# Patient Record
Sex: FEMALE | Race: BLACK OR AFRICAN AMERICAN | NOT HISPANIC OR LATINO | Employment: FULL TIME | URBAN - METROPOLITAN AREA
[De-identification: names, ages, dates, MRNs, and addresses within clinical notes are randomized per-mention and may not be internally consistent; named-entity substitution may affect disease eponyms.]

---

## 2017-03-27 RX ORDER — LEVONORGESTREL/ETHINYL ESTRADIOL AND ETHINYL ESTRADIOL 100-20(84)
KIT ORAL
Qty: 91 TABLET | Refills: 0 | Status: SHIPPED | OUTPATIENT
Start: 2017-03-27 | End: 2018-02-16

## 2018-02-16 ENCOUNTER — OFFICE VISIT (OUTPATIENT)
Dept: OBSTETRICS AND GYNECOLOGY | Facility: CLINIC | Age: 43
End: 2018-02-16

## 2018-02-16 VITALS
HEART RATE: 77 BPM | DIASTOLIC BLOOD PRESSURE: 60 MMHG | SYSTOLIC BLOOD PRESSURE: 105 MMHG | WEIGHT: 204 LBS | HEIGHT: 72 IN | BODY MASS INDEX: 27.63 KG/M2

## 2018-02-16 DIAGNOSIS — Z01.419 PAP SMEAR, AS PART OF ROUTINE GYNECOLOGICAL EXAMINATION: Primary | ICD-10-CM

## 2018-02-16 DIAGNOSIS — N92.6 IRREGULAR BLEEDING: ICD-10-CM

## 2018-02-16 DIAGNOSIS — Z80.3 FAMILY HISTORY OF BREAST CANCER: ICD-10-CM

## 2018-02-16 DIAGNOSIS — S03.00XA DISLOCATION OF TEMPOROMANDIBULAR JOINT, INITIAL ENCOUNTER: ICD-10-CM

## 2018-02-16 DIAGNOSIS — Z01.411 ENCOUNTER FOR GYNECOLOGICAL EXAMINATION WITH ABNORMAL FINDING: ICD-10-CM

## 2018-02-16 PROCEDURE — 99213 OFFICE O/P EST LOW 20 MIN: CPT | Performed by: OBSTETRICS & GYNECOLOGY

## 2018-02-16 PROCEDURE — 99386 PREV VISIT NEW AGE 40-64: CPT | Performed by: OBSTETRICS & GYNECOLOGY

## 2018-02-16 RX ORDER — ALBUTEROL SULFATE 90 UG/1
AEROSOL, METERED RESPIRATORY (INHALATION)
Refills: 0 | COMMUNITY
Start: 2018-01-05 | End: 2018-07-06

## 2018-02-16 RX ORDER — FLUTICASONE PROPIONATE 50 MCG
SPRAY, SUSPENSION (ML) NASAL
Refills: 5 | COMMUNITY
Start: 2017-12-07 | End: 2018-07-06

## 2018-02-16 RX ORDER — CETIRIZINE HYDROCHLORIDE 10 MG/1
TABLET, CHEWABLE ORAL
Refills: 3 | COMMUNITY
Start: 2017-12-07 | End: 2018-07-06

## 2018-02-16 RX ORDER — FLUOXETINE 20 MG/1
TABLET, FILM COATED ORAL
Refills: 0 | COMMUNITY
Start: 2017-12-07 | End: 2018-02-28

## 2018-02-16 NOTE — PROGRESS NOTES
Subjective   Ramona Bear is a 42 y.o. female  4, Para 3 AB 1, Living 3.  Last annual , last pap 2014, last mammogram 0, last colonoscopy 0.  Cc: Annual exam  History of Present Illness  Patient is here for an annual exam but is also having periods approximately every 2 weeks since .  Now periods approximately every 3-1/2 weeks but are still heavier and different from what she is used to.  Status post tubal ligation done in  at which time a small fibroid was identified and some filmy adhesions noted on the right tube along with a hydrosalpinx.  She denies dyspareunia.  States that sometimes when she is a little bit constipated she has a lot of pelvic pressure particularly in the vagina  The following portions of the patient's history were reviewed and updated as appropriate: allergies, current medications, past family history, past medical history, past social history, past surgical history and problem list.    Review of Systems   HENT:        Jaw pain with popping sensation   Genitourinary: Positive for menstrual problem.   All other systems reviewed and are negative.        Past Medical History:   Diagnosis Date   • Anxiety    • Cervical dysplasia    • Fibroid, uterine      Menstrual History:  OB History      Para Term  AB Living    4 3 3  1 3    SAB TAB Ectopic Multiple Live Births    1             Menarche age: 15  Patient's last menstrual period was 2018 (exact date).  Menses normal every 28 days but see history of present illness for details       Past Surgical History:   Procedure Laterality Date   • D&C WITH SUCTION     • EYE SURGERY     • LAPAROSCOPIC TUBAL LIGATION     • LASER ABLATION OF THE CERVIX       OB History      Para Term  AB Living    4 3 3  1 3    SAB TAB Ectopic Multiple Live Births    1            Family History   Problem Relation Age of Onset   • Diabetes Father    • Hypertension Father    • Breast cancer Paternal Aunt 45   •  "Breast cancer Paternal Aunt 45     History   Smoking Status   • Former Smoker   • Types: Cigarettes   • Quit date: 2/16/2017   Smokeless Tobacco   • Never Used     Comment: Occasional     History   Alcohol Use   • Yes     Comment: social     Health Maintenance   Topic Date Due   • TDAP/TD VACCINES (1 - Tdap) 07/08/1996   • PAP SMEAR  06/26/2017   • INFLUENZA VACCINE  08/01/2017   • MAMMOGRAM  02/16/2018       Current Outpatient Prescriptions:   •  cetirizine (ZyrTEC) 10 MG chewable tablet, CSW 1 T PO D UTD, Disp: , Rfl: 3  •  FLUoxetine (PROzac) 20 MG tablet, , Disp: , Rfl: 0  •  fluticasone (FLONASE) 50 MCG/ACT nasal spray, SHAKE LQ AND U 2 SPRAYS IEN BID, Disp: , Rfl: 5  •  VENTOLIN  (90 Base) MCG/ACT inhaler, INHALE 2 PUFFS PO Q 4 H PRF SOB, Disp: , Rfl: 0  Sexual History:Active  STD: Gonorrhea, Trichomonas, HPV       Objective   Vitals:    02/16/18 0951   BP: 105/60   Pulse: 77   Weight: 92.5 kg (204 lb)   Height: 190.5 cm (75\")     Physical Exam   Constitutional: She is oriented to person, place, and time. She appears well-developed and well-nourished.   HENT:   Head: Normocephalic.   Popping sensation and noise with movement of the jaw   Eyes: Pupils are equal, round, and reactive to light.   Neck: Normal range of motion. No thyromegaly present.   Cardiovascular: Normal rate, regular rhythm, normal heart sounds and intact distal pulses.    Pulmonary/Chest: Effort normal and breath sounds normal. No respiratory distress. She exhibits no tenderness. Right breast exhibits no inverted nipple, no mass, no nipple discharge, no skin change and no tenderness. Left breast exhibits no inverted nipple, no mass, no nipple discharge, no skin change and no tenderness. Breasts are symmetrical.   Abdominal: Soft. Bowel sounds are normal. Hernia confirmed negative in the right inguinal area and confirmed negative in the left inguinal area.   Genitourinary: Vagina normal and uterus normal. No breast tenderness or " discharge. Pelvic exam was performed with patient supine. There is no rash, tenderness, lesion or injury on the right labia. There is no rash, tenderness, lesion or injury on the left labia. Uterus is not enlarged and not tender. Cervix exhibits no motion tenderness, no discharge and no friability. Right adnexum displays no mass, no tenderness and no fullness. Left adnexum displays no mass, no tenderness and no fullness.   Genitourinary Comments: Retroverted uterus   Lymphadenopathy:     She has no cervical adenopathy.        Right: No inguinal adenopathy present.        Left: No inguinal adenopathy present.   Neurological: She is alert and oriented to person, place, and time. She has normal reflexes.   Skin: Skin is warm and dry.   Psychiatric: She has a normal mood and affect. Her behavior is normal. Judgment and thought content normal.         Assessment/Plan   Ramona was seen today for gynecologic exam.    Diagnoses and all orders for this visit:    Pap smear, as part of routine gynecological examination  Comments:  Schedule screening mammogram  Orders:  -     IGP, Apt HPV,rfx 16 / 18,45 - ThinPrep Vial, Cervix    Irregular bleeding  Comments:  We'll get ultrasound was same-day appointment    Family history of breast cancer  Comments:  BRCA testing    Encounter for gynecological examination with abnormal finding    Dislocation of temporomandibular joint, initial encounter  Comments:  Referral to Dr. Terell Quiñonez was counseled about breast self-examination, mammograms, history of breast cancer, TMJ

## 2018-02-22 LAB
CYTOLOGIST CVX/VAG CYTO: ABNORMAL
CYTOLOGY CVX/VAG DOC THIN PREP: ABNORMAL
DX ICD CODE: ABNORMAL
HIV 1 & 2 AB SER-IMP: ABNORMAL
HPV I/H RISK 4 DNA CVX QL PROBE+SIG AMP: POSITIVE
HPV16 DNA CVX QL PROBE+SIG AMP: POSITIVE
HPV18+45 E6+E7 MRNA CVX QL NAA+PROBE: NEGATIVE
OTHER STN SPEC: ABNORMAL
PATH REPORT.FINAL DX SPEC: ABNORMAL
STAT OF ADQ CVX/VAG CYTO-IMP: ABNORMAL

## 2018-02-23 ENCOUNTER — TELEPHONE (OUTPATIENT)
Dept: OBSTETRICS AND GYNECOLOGY | Facility: CLINIC | Age: 43
End: 2018-02-23

## 2018-02-23 NOTE — TELEPHONE ENCOUNTER
----- Message from Bjorn Perry MD sent at 2/22/2018  9:38 AM EST -----  Repeat Pap smear in 6 months for HPV  ----- Message -----     From: [facility] Reflab Results In     Sent: 2/22/2018   6:15 AM       To: Bjorn Perry MD

## 2018-02-28 ENCOUNTER — APPOINTMENT (OUTPATIENT)
Dept: WOMENS IMAGING | Facility: HOSPITAL | Age: 43
End: 2018-02-28

## 2018-02-28 ENCOUNTER — PROCEDURE VISIT (OUTPATIENT)
Dept: OBSTETRICS AND GYNECOLOGY | Facility: CLINIC | Age: 43
End: 2018-02-28

## 2018-02-28 ENCOUNTER — OFFICE VISIT (OUTPATIENT)
Dept: OBSTETRICS AND GYNECOLOGY | Facility: CLINIC | Age: 43
End: 2018-02-28

## 2018-02-28 VITALS
HEART RATE: 79 BPM | WEIGHT: 201 LBS | SYSTOLIC BLOOD PRESSURE: 101 MMHG | HEIGHT: 72 IN | BODY MASS INDEX: 27.22 KG/M2 | DIASTOLIC BLOOD PRESSURE: 65 MMHG

## 2018-02-28 DIAGNOSIS — N92.1 MENORRHAGIA WITH IRREGULAR CYCLE: Primary | ICD-10-CM

## 2018-02-28 DIAGNOSIS — N92.6 IRREGULAR MENSES: Primary | ICD-10-CM

## 2018-02-28 DIAGNOSIS — Z12.31 VISIT FOR SCREENING MAMMOGRAM: Primary | ICD-10-CM

## 2018-02-28 LAB
B-HCG UR QL: NEGATIVE
INTERNAL NEGATIVE CONTROL: NEGATIVE
INTERNAL POSITIVE CONTROL: POSITIVE
Lab: NORMAL

## 2018-02-28 PROCEDURE — 58100 BIOPSY OF UTERUS LINING: CPT | Performed by: OBSTETRICS & GYNECOLOGY

## 2018-02-28 PROCEDURE — 76830 TRANSVAGINAL US NON-OB: CPT | Performed by: OBSTETRICS & GYNECOLOGY

## 2018-02-28 PROCEDURE — 81025 URINE PREGNANCY TEST: CPT | Performed by: OBSTETRICS & GYNECOLOGY

## 2018-02-28 PROCEDURE — 77067 SCR MAMMO BI INCL CAD: CPT | Performed by: OBSTETRICS & GYNECOLOGY

## 2018-02-28 PROCEDURE — 77067 SCR MAMMO BI INCL CAD: CPT | Performed by: RADIOLOGY

## 2018-03-02 ENCOUNTER — TELEPHONE (OUTPATIENT)
Dept: OBSTETRICS AND GYNECOLOGY | Facility: CLINIC | Age: 43
End: 2018-03-02

## 2018-03-02 NOTE — TELEPHONE ENCOUNTER
----- Message from Bjorn Perry MD sent at 3/2/2018  9:52 AM EST -----  Tell the patient her mammogram was normal  ----- Message -----     From: Interface, Scans Incoming     Sent: 3/2/2018   8:17 AM       To: Bjorn Perry MD

## 2018-03-05 LAB
DX ICD CODE: NORMAL
DX ICD CODE: NORMAL
PATH REPORT.FINAL DX SPEC: NORMAL
PATH REPORT.GROSS SPEC: NORMAL
PATH REPORT.SITE OF ORIGIN SPEC: NORMAL
PATHOLOGIST NAME: NORMAL
PAYMENT PROCEDURE: NORMAL

## 2018-03-06 ENCOUNTER — OFFICE VISIT (OUTPATIENT)
Dept: OBSTETRICS AND GYNECOLOGY | Facility: CLINIC | Age: 43
End: 2018-03-06

## 2018-03-06 VITALS
WEIGHT: 203 LBS | DIASTOLIC BLOOD PRESSURE: 71 MMHG | BODY MASS INDEX: 27.5 KG/M2 | SYSTOLIC BLOOD PRESSURE: 112 MMHG | HEIGHT: 72 IN | HEART RATE: 80 BPM

## 2018-03-06 DIAGNOSIS — N92.6 IRREGULAR BLEEDING: Primary | ICD-10-CM

## 2018-03-06 DIAGNOSIS — D25.9 UTERINE LEIOMYOMA, UNSPECIFIED LOCATION: ICD-10-CM

## 2018-03-06 PROCEDURE — 99213 OFFICE O/P EST LOW 20 MIN: CPT | Performed by: OBSTETRICS & GYNECOLOGY

## 2018-03-06 RX ORDER — NORETHINDRONE ACETATE AND ETHINYL ESTRADIOL 1MG-20(21)
1 KIT ORAL DAILY
Qty: 28 TABLET | Refills: 12 | Status: SHIPPED | OUTPATIENT
Start: 2018-03-06 | End: 2018-07-06

## 2018-03-06 NOTE — PROGRESS NOTES
Subjective   Ramona Bear is a 42 y.o. female.   EC: Follow-up endometrial biopsy  History of Present Illness  Pathology report showed no active endometrium on the biopsy site with negative endocervical tissue.  Endometrial shadow was 0.9 cm with no evidence of endometrial pathology and as such no repeat endometrial biopsy will be performed.  Patient is a nonsmoker.  The following portions of the patient's history were reviewed and updated as appropriate: allergies, current medications, past family history, past medical history, past social history, past surgical history and problem list.    Review of Systems   Genitourinary: Positive for menstrual problem.   All other systems reviewed and are negative.      Objective   Physical Exam   Constitutional: She appears well-developed and well-nourished.   Neurological: She is alert.   Skin: Skin is warm.   Psychiatric: She has a normal mood and affect. Her behavior is normal. Judgment and thought content normal.   Vitals reviewed.      Assessment/Plan   Ramona was seen today for follow-up.    Diagnoses and all orders for this visit:    Irregular bleeding  Comments:  Try to regulate with birth control pills    Uterine leiomyoma, unspecified location    Other orders  -     norethindrone-ethinyl estradiol FE (MICROGESTIN FE 1/20) 1-20 MG-MCG per tablet; Take 1 tablet by mouth Daily.   Face-to-face time with patient 15 minutes of which 15 minutes were spent discussing the above-noted findings and plan of therapy

## 2018-06-28 RX ORDER — LEVONORGESTREL/ETHINYL ESTRADIOL AND ETHINYL ESTRADIOL 100-20(84)
KIT ORAL
Qty: 91 TABLET | Refills: 0 | Status: SHIPPED | OUTPATIENT
Start: 2018-06-28 | End: 2018-07-06

## 2018-07-06 ENCOUNTER — OFFICE VISIT (OUTPATIENT)
Dept: OBSTETRICS AND GYNECOLOGY | Facility: CLINIC | Age: 43
End: 2018-07-06

## 2018-07-06 VITALS
BODY MASS INDEX: 26.01 KG/M2 | DIASTOLIC BLOOD PRESSURE: 68 MMHG | WEIGHT: 192 LBS | SYSTOLIC BLOOD PRESSURE: 118 MMHG | HEIGHT: 72 IN | HEART RATE: 68 BPM

## 2018-07-06 DIAGNOSIS — N89.8 VAGINAL DISCHARGE: Primary | ICD-10-CM

## 2018-07-06 DIAGNOSIS — N84.1 CERVICAL POLYP: ICD-10-CM

## 2018-07-06 PROCEDURE — 99213 OFFICE O/P EST LOW 20 MIN: CPT | Performed by: OBSTETRICS & GYNECOLOGY

## 2018-07-06 RX ORDER — NORETHINDRONE ACETATE AND ETHINYL ESTRADIOL 1MG-20(21)
KIT ORAL
COMMUNITY
End: 2021-08-09

## 2018-07-06 NOTE — PROGRESS NOTES
Subjective   Ramona Bear is a 42 y.o. female.   Cc: Postcoital bleeding  History of Present Illness  She's had a 1-2 week history of postcoital mucus discharge with some bloody material minute.  Denies any dysmenorrhea or dyspareunia.  She has had a previous history of dysplasia which was treated with laser ablation of the cervix.  The following portions of the patient's history were reviewed and updated as appropriate: allergies, current medications, past family history, past medical history, past social history, past surgical history and problem list.    Review of Systems   Genitourinary:        See history of present illness   All other systems reviewed and are negative.      Objective   Physical Exam   Constitutional: She is oriented to person, place, and time. She appears well-developed and well-nourished. No distress.   Genitourinary: Vagina normal. Pelvic exam was performed with patient supine. There is no lesion on the right labia. There is no lesion on the left labia. Cervix exhibits friability. Cervix exhibits no motion tenderness and no discharge.   Genitourinary Comments: Cervical polyp noted in the cervical os slightly superior.  Portions were removed.  Monsel solution applied   Neurological: She is alert and oriented to person, place, and time.   Skin: Skin is warm and dry.   Psychiatric: She has a normal mood and affect. Her behavior is normal. Judgment and thought content normal.   Vitals reviewed.      Assessment/Plan   Ramona was seen today for vaginal bleeding.    Diagnoses and all orders for this visit:    Vaginal discharge  -     NuSwab VG+ - Swab, Vagina    Cervical polyp  -     Reference Histopathology

## 2018-07-10 ENCOUNTER — DOCUMENTATION (OUTPATIENT)
Dept: OBSTETRICS AND GYNECOLOGY | Facility: CLINIC | Age: 43
End: 2018-07-10

## 2018-07-10 NOTE — PROGRESS NOTES
Just received a telephone call from the pathologist who is reading the biopsy from the cervical polyp obtained last week.  Diagnoses uncertain but it appears to be malignant and the differentiation is unclear.  At this time is pending further diagnostic testing.  We'll notification of the patient's until a more specific diagnosis can be obtained.

## 2018-07-11 LAB
A VAGINAE DNA VAG QL NAA+PROBE: ABNORMAL SCORE
BVAB2 DNA VAG QL NAA+PROBE: ABNORMAL SCORE
C ALBICANS DNA VAG QL NAA+PROBE: NEGATIVE
C GLABRATA DNA VAG QL NAA+PROBE: NEGATIVE
C TRACH RRNA SPEC QL NAA+PROBE: NEGATIVE
MEGA1 DNA VAG QL NAA+PROBE: ABNORMAL SCORE
N GONORRHOEA RRNA SPEC QL NAA+PROBE: NEGATIVE
T VAGINALIS RRNA SPEC QL NAA+PROBE: NEGATIVE

## 2018-07-12 ENCOUNTER — TELEPHONE (OUTPATIENT)
Dept: OBSTETRICS AND GYNECOLOGY | Facility: CLINIC | Age: 43
End: 2018-07-12

## 2018-07-12 RX ORDER — METRONIDAZOLE 500 MG/1
500 TABLET ORAL 2 TIMES DAILY
Qty: 10 TABLET | Refills: 0 | Status: SHIPPED | OUTPATIENT
Start: 2018-07-12 | End: 2018-07-17

## 2018-07-12 NOTE — TELEPHONE ENCOUNTER
----- Message from Bjorn Perry MD sent at 7/12/2018 10:48 AM EDT -----  Tell the patient she has BV and a prescription is been sent to the pharmacy.  Tell her that we are awaiting further testing on the biopsy of her cervical polyp and the flap wanted further tests to make the diagnosis .As soon as we get the results we will call her  ----- Message -----  From: Elvira, Reflab Results In  Sent: 7/10/2018   3:16 PM  To: Bjorn Perry MD

## 2018-07-18 ENCOUNTER — TELEPHONE (OUTPATIENT)
Dept: OBSTETRICS AND GYNECOLOGY | Facility: CLINIC | Age: 43
End: 2018-07-18

## 2018-07-18 DIAGNOSIS — C53.9 MALIGNANT NEOPLASM OF CERVIX, UNSPECIFIED SITE (HCC): Primary | ICD-10-CM

## 2018-07-18 NOTE — TELEPHONE ENCOUNTER
I spoke with the patient in the pathologist today about the findings on her cervical biopsy..  Pathology is still pending 2 due to some technical problems in transmitting the cellblock's to the consulting service.  This should be ready for by the end of the week.  I talked to the patient and advised her that there is malignancy source of which is uncertain and we're awaiting final pathology report.  We will schedule her with GYN oncology ASAP.  Patient understands and accepts the plan.

## 2018-07-20 ENCOUNTER — TELEPHONE (OUTPATIENT)
Dept: OBSTETRICS AND GYNECOLOGY | Facility: CLINIC | Age: 43
End: 2018-07-20

## 2018-07-20 LAB
DX ICD CODE: NORMAL
PATH REPORT.FINAL DX SPEC: NORMAL
PATH REPORT.GROSS SPEC: NORMAL
PATH REPORT.SITE OF ORIGIN SPEC: NORMAL
PATH REPORT.SUPPLEMENTAL REPORTS SPEC: NORMAL
PATHOLOGIST NAME: NORMAL
PAYMENT PROCEDURE: NORMAL

## 2018-07-20 NOTE — TELEPHONE ENCOUNTER
----- Message from Cynthia Reyer sent at 7/20/2018  8:20 AM EDT -----  Pt has a couple of questions because she is very nervous about seeing her oncologist. She is asking if you can please give her a call. # [phone]

## 2018-07-20 NOTE — TELEPHONE ENCOUNTER
Some questions about what she could anticipate as to therapy.  I advised her that she probably will have a hysterectomy performed and further therapy depends on the pathology and the findings at the time of surgery.

## 2018-07-20 NOTE — TELEPHONE ENCOUNTER
I spoke with the patient today and gave her the final pathology report.  Message was also sent to Dr. David ordering the pathology report

## 2018-07-24 ENCOUNTER — TELEPHONE (OUTPATIENT)
Dept: OBSTETRICS AND GYNECOLOGY | Facility: CLINIC | Age: 43
End: 2018-07-24

## 2018-07-24 NOTE — TELEPHONE ENCOUNTER
----- Message from Lesly Chan MA sent at 7/24/2018 11:06 AM EDT -----  Will you make sure the oncologist has this report.joe

## 2019-04-04 ENCOUNTER — OFFICE VISIT (OUTPATIENT)
Dept: OBSTETRICS AND GYNECOLOGY | Facility: CLINIC | Age: 44
End: 2019-04-04

## 2019-04-04 VITALS
BODY MASS INDEX: 28.44 KG/M2 | SYSTOLIC BLOOD PRESSURE: 116 MMHG | DIASTOLIC BLOOD PRESSURE: 64 MMHG | WEIGHT: 210 LBS | HEIGHT: 72 IN

## 2019-04-04 DIAGNOSIS — R23.8 VESICULAR SKIN LESIONS: Primary | ICD-10-CM

## 2019-04-04 PROCEDURE — 99214 OFFICE O/P EST MOD 30 MIN: CPT | Performed by: OBSTETRICS & GYNECOLOGY

## 2019-04-04 RX ORDER — BUSPIRONE HYDROCHLORIDE 7.5 MG/1
7.5 TABLET ORAL
COMMUNITY
Start: 2019-02-05 | End: 2021-08-09

## 2019-04-04 RX ORDER — OLANZAPINE 2.5 MG/1
2.5 TABLET ORAL DAILY
COMMUNITY
Start: 2019-02-05 | End: 2021-08-09

## 2019-04-04 NOTE — PROGRESS NOTES
HPI   Ramona Bear  is a 43 y.o. female who presents for 2 reasons.  First, she has been experiencing pain in her pubic bone.  This is been present for the last 1-2 years.  It is neither improved, nor has it worsened.  It is not accompanied by vaginal bleeding.  The patient is uncertain if she has fever or chills, but she does experience sweats.  Her course is complicated by history of a neuroendocrine tumor of the cervix.  This was treated by GYN oncology with a hysterectomy.  Also, this was followed by chemotherapy and radiation.  The patient is still current with GYN oncology.  Her last PET scan was in November and the patient reports that it was negative.  Second, the patient has been experiencing blisters on the vulva.  This occurs approximately every month to 6 weeks.  The blisters are very painful.  They itch and last for approximately 1 week.  She has not previously been diagnosed with herpes.  She has blisters present today.    Chief Complaint   Patient presents with   • Follow-up     Patient is here for pelvic bone pain.       Past Medical History:   Diagnosis Date   • Anxiety    • BRCA negative    • Cervical dysplasia    • Fibroid, uterine        Past Surgical History:   Procedure Laterality Date   • D&C WITH SUCTION     • EYE SURGERY     • LAPAROSCOPIC TUBAL LIGATION     • LASER ABLATION OF THE CERVIX         Social History     Socioeconomic History   • Marital status:      Spouse name: Not on file   • Number of children: Not on file   • Years of education: Not on file   • Highest education level: Not on file   Tobacco Use   • Smoking status: Former Smoker     Types: Cigarettes     Last attempt to quit: 2017     Years since quittin.1   • Smokeless tobacco: Never Used   • Tobacco comment: Occasional   Substance and Sexual Activity   • Alcohol use: Yes     Comment: social   • Drug use: No   • Sexual activity: Yes     Partners: Male     Birth control/protection: Surgical       The  following portions of the patient's history were reviewed and updated as appropriate: allergies, current medications, past family history, past medical history, past social history, past surgical history and problem list.    Review of Systems this is positive for pain in the pubic bone.  It is positive for intermittent vulvar and perineal blisters.  It is negative for vaginal bleeding.  It is equivocal for fever or chills.  It is positive for sweats.  It is negative for unexplained weight change.  It is negative for suicidal or homicidal ideation.  All other systems are reviewed and are negative          Physical Exam   Constitutional: She is oriented to person, place, and time. She appears well-developed and well-nourished.   HENT:   Head: Normocephalic and atraumatic.   Abdominal: Soft. She exhibits no distension and no mass. There is no tenderness.   Genitourinary:         Genitourinary Comments: Vesicular lesions are present at the patient's right labia minora and right labia majora.  There are no other vulvar lesions visible or palpable.  The vagina is estrogenized and without lesion  The cuff is well approximated  There are no pelvic masses palpable   Neurological: She is alert and oriented to person, place, and time.   Skin: Skin is warm and dry.   Psychiatric: She has a normal mood and affect. Her behavior is normal.   Nursing note and vitals reviewed.      Assessment    Ramona was seen today for follow-up.    Diagnoses and all orders for this visit:    Vesicular skin lesions        Plan    Social History     Tobacco Use   Smoking Status Former Smoker   • Types: Cigarettes   • Last attempt to quit: 2017   • Years since quittin.1   Tobacco Comment    Occasional   1. Pubic bone pain.  The cause of this is not entirely clear.  There is not a causative lesion found on physical examination today.  I counseled the patient regarding this.  I recommend that she follow-up with her oncologist for further  evaluation.  This could represent a side effect of radiation.  The patient has an appointment with her oncologist soon.  2. Vesicular lesions of the perineum.  Counseled.  By physical examination, this is most consistent with herpes virus.  We discussed the pathophysiology of this and possible treatments.  Cultures were performed.  Further workup pending the results of the studies.    3.

## 2019-04-10 LAB
A VAGINAE DNA VAG QL NAA+PROBE: ABNORMAL SCORE
BVAB2 DNA VAG QL NAA+PROBE: ABNORMAL SCORE
C ALBICANS DNA VAG QL NAA+PROBE: NEGATIVE
C GLABRATA DNA VAG QL NAA+PROBE: NEGATIVE
HSV1 DNA SPEC QL NAA+PROBE: NEGATIVE
HSV2 DNA SPEC QL NAA+PROBE: POSITIVE
MEGA1 DNA VAG QL NAA+PROBE: ABNORMAL SCORE
T VAGINALIS RRNA SPEC QL NAA+PROBE: NEGATIVE

## 2019-04-10 RX ORDER — VALACYCLOVIR HYDROCHLORIDE 500 MG/1
500 TABLET, FILM COATED ORAL 2 TIMES DAILY
Qty: 18 TABLET | Refills: 4 | Status: SHIPPED | OUTPATIENT
Start: 2019-04-10 | End: 2019-04-13

## 2019-04-29 ENCOUNTER — TELEPHONE (OUTPATIENT)
Dept: OBSTETRICS AND GYNECOLOGY | Facility: CLINIC | Age: 44
End: 2019-04-29

## 2019-04-29 NOTE — TELEPHONE ENCOUNTER
Pt called to report the valACYclovir (VALTREX) 500 MG tablet, rx'd on 4/10/19, is not covered by her insurance, and is quite expensive out of pocket.  Pt is asking for an alternative medication, if available.  Please advise.    Pt # [phone]

## 2019-04-30 NOTE — TELEPHONE ENCOUNTER
Please ask the patient to contact her insurer for a list of covered medications.  This is the first time I have seen Valtrex not to be covered.  It is possible that a mistake has been made?

## 2021-07-07 ENCOUNTER — TELEPHONE (OUTPATIENT)
Dept: OBSTETRICS AND GYNECOLOGY | Facility: CLINIC | Age: 46
End: 2021-07-07

## 2021-07-07 NOTE — TELEPHONE ENCOUNTER
"Luisito,     I got a follow up BRCA test that [facility] did in 2018 - that you saw as a patient in 2019.     I sent a message to Lesly Chan to reach out to her about this result as well as seeing in office for discussion of results.     Received follow up from [facility].  In Feb, 2018 she had [facility] BRCA testing done with Dr. Perry.  I can not seen anywhere in Epic that these results were conveyed to the patient, nor any plans related to them. 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    Just wanted to let you know so you can handle as you feel appropriate. I will leave it in your box at [facility] to review.     Thanks,   Royce   "

## 2021-07-07 NOTE — TELEPHONE ENCOUNTER
"Libia,     Last seen by Dr. Salazar in 2019. Received follow up from [facility].  In Feb, 2018 she had [facility] BRCA testing done with Dr. Perry.  I can not seen anywhere in Epic that these results were conveyed to the patient, nor any plans related to them. Initial esults showed MSH6 variant - \"genetic variant of uncertain significance\" - Now thankfully this has been downgraded to no clinical significance.     She should likely follow up and discuss with Dr. Salazar at an annual exam (overdue).  Can you reach out to her about this??    Thanks,   Dr. Darnell  "

## 2021-07-22 ENCOUNTER — TELEPHONE (OUTPATIENT)
Dept: GASTROENTEROLOGY | Facility: CLINIC | Age: 46
End: 2021-07-22

## 2021-07-22 ENCOUNTER — OFFICE VISIT (OUTPATIENT)
Dept: OBSTETRICS AND GYNECOLOGY | Facility: CLINIC | Age: 46
End: 2021-07-22

## 2021-07-22 VITALS
DIASTOLIC BLOOD PRESSURE: 67 MMHG | SYSTOLIC BLOOD PRESSURE: 116 MMHG | WEIGHT: 215 LBS | HEIGHT: 72 IN | BODY MASS INDEX: 29.12 KG/M2

## 2021-07-22 DIAGNOSIS — Z80.3 FH: BREAST CANCER: ICD-10-CM

## 2021-07-22 DIAGNOSIS — C53.9 MALIGNANT NEOPLASM OF CERVIX, UNSPECIFIED SITE (HCC): ICD-10-CM

## 2021-07-22 DIAGNOSIS — Z00.00 ANNUAL PHYSICAL EXAM: Primary | ICD-10-CM

## 2021-07-22 DIAGNOSIS — Z12.11 COLON CANCER SCREENING: ICD-10-CM

## 2021-07-22 PROCEDURE — 99396 PREV VISIT EST AGE 40-64: CPT | Performed by: OBSTETRICS & GYNECOLOGY

## 2021-07-22 PROCEDURE — 99213 OFFICE O/P EST LOW 20 MIN: CPT | Performed by: OBSTETRICS & GYNECOLOGY

## 2021-07-22 NOTE — PROGRESS NOTES
JESSIE   Ramona Bear  is a 46 y.o. female who presents for several reasons.  First, she is due for a routine gynecologic exam.  Overall, she is feeling well.  Bowels and bladder are functioning normally.  She is due for breast cancer screening.  Has never undergone colon cancer screening.  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  The patient has completed radiation and chemotherapy.  She remains current with her oncologist.  Currently, she does not have unexplained weight change.  Does have night sweats.  Next, she underwent my risk screening in 2018.  An addended report was sent this year and she would like to discuss it.  Next, the patient has a family history of breast cancer including at least 2 family members.  An aunt and a maternal cousin.  There may be a third family member as well.    Chief Complaint   Patient presents with   • Gynecologic Exam     Patient is here for a annual and test results.       Past Medical History:   Diagnosis Date   • Anxiety    • BRCA negative    • Cervical dysplasia    • Fibroid, uterine        Past Surgical History:   Procedure Laterality Date   • D & C WITH SUCTION     • EYE SURGERY     • LAPAROSCOPIC TUBAL LIGATION     • LASER ABLATION OF THE CERVIX         Social History     Socioeconomic History   • Marital status:      Spouse name: Not on file   • Number of children: Not on file   • Years of education: Not on file   • Highest education level: Not on file   Tobacco Use   • Smoking status: Former Smoker     Types: Cigarettes     Quit date: 2017     Years since quittin.4   • Smokeless tobacco: Never Used   • Tobacco comment: Occasional   Substance and Sexual Activity   • Alcohol use: Yes     Comment: social   • Drug use: No   • Sexual activity: Yes     Partners: Male     Birth control/protection: Surgical       The following portions of the patient's history were  reviewed and updated as appropriate: allergies, current medications, past family history, past medical history, past social history, past surgical history and problem list.    Review of Systems      Patient reports that she is not currently experiencing any symptoms of urinary incontinence.  This is negative for fever or chills.  It is positive for night sweats.  It is negative for itching.  Negative for unexplained weight change.  All other systems are reviewed and are negative.    Physical Exam  Vitals and nursing note reviewed.   Constitutional:       Appearance: She is well-developed.   HENT:      Head: Normocephalic and atraumatic.   Cardiovascular:      Rate and Rhythm: Normal rate and regular rhythm.   Pulmonary:      Effort: Pulmonary effort is normal.      Breath sounds: Normal breath sounds. No wheezing or rales.   Chest:      Comments: The breasts are homogeneous.  There are no palpable lumps.  Nipple discharge and axillary adenopathy are absent.  Abdominal:      General: There is no distension.      Palpations: Abdomen is soft.      Tenderness: There is no abdominal tenderness.   Genitourinary:     Labia:         Right: No lesion.         Left: No lesion.       Adnexa:         Right: No mass or tenderness.          Left: No mass or tenderness.        Comments: The vagina is estrogenized.  The urethra and urethral meatus are normal.  Bartholin's and Blawenburg's glands are normal.  Support for the vagina is adequate.  There are no vaginal lesions.  There are no pelvic masses palpable.  Anal sphincter tone is normal.  Rectovaginal septum is normal.  No anal or rectal masses palpable  Skin:     General: Skin is warm and dry.   Neurological:      Mental Status: She is alert and oriented to person, place, and time.         Assessment    Diagnoses and all orders for this visit:    1. Annual physical exam (Primary)    2. Malignant neoplasm of cervix, unspecified site (CMS/HCC)  -     MRI Breast Bilateral With &  Without Contrast; Future    3. FH: breast cancer  -     MRI Breast Bilateral With & Without Contrast; Future    4. Colon cancer screening  -     Ambulatory Referral to Gastroenterology        Plan  1. Annual examination performed  2. Counseled regarding the importance of breast cancer screening.  The patient has a strong family history of breast cancer with at least 2 relatives who have been diagnosed.  There may be a third, but the patient is uncertain.  We discussed the benefits and risks of counseling with a  versus continuing yearly mammograms versus a breast MRI scan.  I answered the patient's questions.  She would like to have a breast MRI scan.  This has been ordered.  3. 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  We reviewed the my risk report and I answered the patient's questions.  This mutation has been downgraded to a mutation of no clinical significance.  I answered the patient's questions.  We discussed referral to a  for further counseling.  The patient declines this.  A copy of her amended my risk report was given to her along with the explanatory folder.  4. Personal history of cervical cancer which partially consisted of adenocarcinoma.  We discussed my recommendations for early screening for colon cancer.  The patient agrees.  A referral to GI has been sent for possible colonoscopy.    5. No follow-ups on file.    Social History     Tobacco Use   Smoking Status Former Smoker   • Types: Cigarettes   • Quit date: 2017   • Years since quittin.4   Tobacco Comment    Occasional   6.

## 2021-08-03 ENCOUNTER — TELEPHONE (OUTPATIENT)
Dept: OBSTETRICS AND GYNECOLOGY | Facility: CLINIC | Age: 46
End: 2021-08-03

## 2021-08-03 LAB
CONV .: ABNORMAL
CYTOLOGIST CVX/VAG CYTO: ABNORMAL
CYTOLOGY CVX/VAG DOC CYTO: ABNORMAL
CYTOLOGY CVX/VAG DOC THIN PREP: ABNORMAL
DX ICD CODE: ABNORMAL
DX ICD CODE: ABNORMAL
HIV 1 & 2 AB SER-IMP: ABNORMAL
HPV I/H RISK 4 DNA CVX QL PROBE+SIG AMP: NEGATIVE
OTHER STN SPEC: ABNORMAL
PATHOLOGIST CVX/VAG CYTO: ABNORMAL
RECOM F/U CVX/VAG CYTO: ABNORMAL
STAT OF ADQ CVX/VAG CYTO-IMP: ABNORMAL

## 2021-08-03 NOTE — TELEPHONE ENCOUNTER
Jonathon Salazar,    Patient's lab results have come in but have not yet been resulted by you. She is wanting to know if they are cancerous. Please advise.    Thank you,  Raquel

## 2021-08-09 ENCOUNTER — APPOINTMENT (OUTPATIENT)
Dept: LAB | Facility: HOSPITAL | Age: 46
End: 2021-08-09

## 2021-08-09 ENCOUNTER — OFFICE VISIT (OUTPATIENT)
Dept: GYNECOLOGIC ONCOLOGY | Facility: CLINIC | Age: 46
End: 2021-08-09

## 2021-08-09 VITALS
TEMPERATURE: 96.8 F | RESPIRATION RATE: 18 BRPM | HEART RATE: 80 BPM | DIASTOLIC BLOOD PRESSURE: 77 MMHG | WEIGHT: 214.2 LBS | BODY MASS INDEX: 29.01 KG/M2 | SYSTOLIC BLOOD PRESSURE: 109 MMHG | OXYGEN SATURATION: 98 % | HEIGHT: 72 IN

## 2021-08-09 DIAGNOSIS — R51.9 CHRONIC NONINTRACTABLE HEADACHE, UNSPECIFIED HEADACHE TYPE: ICD-10-CM

## 2021-08-09 DIAGNOSIS — G89.29 CHRONIC NONINTRACTABLE HEADACHE, UNSPECIFIED HEADACHE TYPE: ICD-10-CM

## 2021-08-09 DIAGNOSIS — C7A.8 NEUROENDOCRINE CARCINOMA OF CERVIX (HCC): Primary | ICD-10-CM

## 2021-08-09 PROBLEM — G43.009 MIGRAINE WITHOUT AURA AND WITHOUT STATUS MIGRAINOSUS, NOT INTRACTABLE: Status: ACTIVE | Noted: 2019-07-29

## 2021-08-09 PROBLEM — F41.1 GENERALIZED ANXIETY DISORDER: Status: ACTIVE | Noted: 2018-08-08

## 2021-08-09 PROCEDURE — 99204 OFFICE O/P NEW MOD 45 MIN: CPT | Performed by: OBSTETRICS & GYNECOLOGY

## 2021-08-09 NOTE — PROGRESS NOTES
Age: 46 y.o.  Sex: female  :  1975  MRN: [id number]       REFERRING PHYSICIAN: JOHANNA Lilly  DATE OF VISIT: 2021        Ramona Bear presents to [facility] for ongoing surveillance of FIGO IB2, NEC cervix. She compelted therapy in 2018 and has remained PATRICIA since that time. She denies c/o, no bleeding, pain etc.  Recently had ASCUS on pap - will have colpo next week with Cheo Salazar. CT A/P 21 showed PATRICIA.       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       • 18: Pelvic US: uterus- 9.19 x 5.55 x 5.26 cm; ET 0.96 cm; BL ovaries WNL. Uterine fibroid1.55 x 1.34 cm.   • 18: Cervical polyp BX: Small cell neuroendocrine carcinoma of cervix. Stains positive for synaptophysin, and CD56, confirming the neuroendocrine nature of the malignancy. IHC for p16 is negative, confirming absence of squamous differentiation. p16 is diffusely and strongly positive, suggesting an HPV driven process.   • 18: PET- a 1 cm rounded area of focal hot activity in the vaginal vault. There is a small amount of nonspecific mildly hypermetabolic activity throughout the region of the endometrial cavity.There is a small amount of symmetrical moderately hypermetabolic activity involving the vaginal labia without corresponding pathology identified on the CT images.   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 P16, synaptophysin, pancytokeratin AE1/ AE3 positive, with rare p63 positivity within the neuroendocrine component; Ki-67 strong at 100%.  "  • 9/4/18- 11/14/18: Cisplatin/ Etoposide x 4C (Cisplatin 75mg/m2 d1, Etoposide 100mg/m2 d1-3, q21d)  • 9/25/18: Caris results- MSI stable, Low Tumor mutation burden, Positive for MLH1, MSH2, MSH6, PMS2, PD-1, TS, RRM1, TOPO1, ER, MS, TOP2A.  • 10/14/20: CT CAP- PATRICIA  • 2/12/21: CT CAP- PATRICIA  • 7/14/21: CT CAP-PATRICIA      8/9/21: 3 month follow up w/ exam             MEDICATIONS:  No current outpatient medications on file.    ALLERGIES:  No Known Allergies      ROS:  CONSTITUTIONAL:  Denies fever or chills.   NEUROLOGIC:  Denies headache, focal weakness or sensory changes.   EYES:  Denies change in visual acuity.  HEENT:  Denies nasal congestion or sore throat.   RESPIRATORY:  Denies cough or shortness of breath.   CARDIOVASCULAR:  Denies chest pain or edema.   GI:  Denies abdominal pain, nausea, vomiting, bloody stools or diarrhea.   :  Denies dysuria, leaking or incontinence.  MUSCULOSKELETAL:  Denies back pain or joint pain.   INTEGUMENT:  Denies rash.   ENDOCRINE:  Denies polyuria or polydipsia.   LYMPHATIC:  Denies swollen glands or lymphedema.   PSYCHIATRIC:  Denies depression or anxiety.      PHYSICAL EXAM:  Vitals:    08/09/21 0842   BP: 109/77   Pulse: 80   Resp: 18   Temp: 96.8 °F (36 °C)   TempSrc: Temporal   SpO2: 98%   Weight: 97.2 kg (214 lb 3.2 oz)   Height: 185.4 cm (73\")  Comment: New patient height   PainSc: 0-No pain     Body mass index is 28.26 kg/m².  Current Status 8/9/2021   ECOG score 0     PHQ-9 Total Score: 0       GEN: alert and oriented x 3, normal affect, well nourished and hydrated.  CARDIO: regular rate and rhythm.  PULM: Lungs CTA b.l, no RRW   ABD: Soft, nontender, nondistended.   GYN: External genitalia normal, no lesions, speculum exam without lesions, cuff visualized and is normal. Bimanual exam does not reveal any palpable lesions.   EXT: No petechiae, bruising, rash, candida. No CCE.       Result Review :  The following data was reviewed by: Catrachita David DO on " 08/09/2021:  LABS:   WBC   Date Value Ref Range Status   01/10/2020 3.50 (L) 4.5 - 11.0 10*3/uL Final     RBC   Date Value Ref Range Status   01/10/2020 4.46 4.0 - 5.2 10*6/uL Final     Hemoglobin   Date Value Ref Range Status   01/10/2020 12.3 12.0 - 16.0 g/dL Final     Hematocrit   Date Value Ref Range Status   01/10/2020 37.4 36.0 - 46.0 % Final     Platelets   Date Value Ref Range Status   01/10/2020 222 140 - 440 10*3/uL Final     No results found for:         IMAGING:    IMPRESSION: Stable exam, with no evidence of recurrent or metastatic disease in the abdomen or pelvis.     Dictated by: Shaan King M.D.     Images and Report reviewed and interpreted by: Shaan King M.D.     <PS><Electronically signed by: Shaan King M.D.>   07/14/2021 1611     D: 07/14/2021 1606   T: 07/14/2021 1606          ASSESSMENT :  • H/o FIGO IB2 NEC cervix  - PATRICIA since 11/2018.  - Normal CT   • ASCUS pap recently -following up with Dr Salazar           PLAN :  • Continue 3 month surveillance until 11/2021 then can move to q 6 months.                      XIOMARA MelendezO  8/9/2021    Gynecologic Oncology   [address] [place] [address]  [phone] office

## 2021-08-12 ENCOUNTER — PREP FOR SURGERY (OUTPATIENT)
Dept: OTHER | Facility: HOSPITAL | Age: 46
End: 2021-08-12

## 2021-08-12 ENCOUNTER — TELEPHONE (OUTPATIENT)
Dept: GASTROENTEROLOGY | Facility: CLINIC | Age: 46
End: 2021-08-12

## 2021-08-12 DIAGNOSIS — Z12.11 ENCOUNTER FOR SCREENING FOR MALIGNANT NEOPLASM OF COLON: Primary | ICD-10-CM

## 2021-08-12 NOTE — TELEPHONE ENCOUNTER
Screening colonoscopy--no personal or family hx of polyps or colon ca-- no ASA or blood thinners-- patient not currently taking any medication.    OA form scanned into media

## 2021-08-19 ENCOUNTER — PROCEDURE VISIT (OUTPATIENT)
Dept: OBSTETRICS AND GYNECOLOGY | Facility: CLINIC | Age: 46
End: 2021-08-19

## 2021-08-19 VITALS
DIASTOLIC BLOOD PRESSURE: 70 MMHG | SYSTOLIC BLOOD PRESSURE: 118 MMHG | BODY MASS INDEX: 28.99 KG/M2 | HEIGHT: 72 IN | WEIGHT: 214 LBS

## 2021-08-19 DIAGNOSIS — R87.619 ABNORMAL CERVICAL PAPANICOLAOU SMEAR, UNSPECIFIED ABNORMAL PAP FINDING: Primary | ICD-10-CM

## 2021-08-19 NOTE — PROGRESS NOTES
The patient presented today for colposcopy of the vagina.  She has a history of a mixed cervical cancer with neuroendocrine and adenocarcinoma.  Pap was ASCUS.  I counseled the patient and answered her questions.  We did not proceed with colposcopy today, as a Cytobrush was not available.  I feel that for vaginal colposcopy, that is a better instrument.  I counseled the patient and answered her questions.  She agrees with my recommendations that we order a Cytobrush and reschedule the colposcopy within the next several weeks.

## 2021-09-16 ENCOUNTER — PROCEDURE VISIT (OUTPATIENT)
Dept: OBSTETRICS AND GYNECOLOGY | Facility: CLINIC | Age: 46
End: 2021-09-16

## 2021-09-16 VITALS
BODY MASS INDEX: 29.39 KG/M2 | HEIGHT: 72 IN | DIASTOLIC BLOOD PRESSURE: 68 MMHG | SYSTOLIC BLOOD PRESSURE: 118 MMHG | WEIGHT: 217 LBS

## 2021-09-16 DIAGNOSIS — R87.610 ASCUS OF CERVIX WITH NEGATIVE HIGH RISK HPV: ICD-10-CM

## 2021-09-16 PROCEDURE — 57421 EXAM/BIOPSY OF VAG W/SCOPE: CPT | Performed by: OBSTETRICS & GYNECOLOGY

## 2021-09-16 NOTE — PROGRESS NOTES
HPI   Ramona Bear  is a 46 y.o. female who presents for colposcopy of the vagina.  She has a history of a hysterectomy for a mixed cervical cancer.  Her most recent vaginal Pap smear showed atypical cells of undetermined significance.  In view of this history, I felt that colposcopy would be most appropriate.  I counseled the patient and answered her questions.  She agreed to proceed.    Chief Complaint   Patient presents with   • Colposcopy     Patient is here for a vaginal colpo.       Past Medical History:   Diagnosis Date   • Anxiety    • BRCA negative    • Cervical dysplasia    • Fibroid, uterine        Past Surgical History:   Procedure Laterality Date   • D & C WITH SUCTION     • EYE SURGERY     • LAPAROSCOPIC TUBAL LIGATION     • LASER ABLATION OF THE CERVIX         Social History     Socioeconomic History   • Marital status:      Spouse name: Not on file   • Number of children: Not on file   • Years of education: Not on file   • Highest education level: Not on file   Tobacco Use   • Smoking status: Former Smoker     Types: Cigarettes     Quit date: 2017     Years since quittin.5   • Smokeless tobacco: Never Used   • Tobacco comment: Occasional   Substance and Sexual Activity   • Alcohol use: Yes     Comment: social   • Drug use: No   • Sexual activity: Yes     Partners: Male     Birth control/protection: Surgical       The following portions of the patient's history were reviewed and updated as appropriate: allergies, current medications, past family history, past medical history, past social history, past surgical history and problem list.    Review of Systems          Physical Exam  Vitals and nursing note reviewed.   Constitutional:       Appearance: Normal appearance.   Genitourinary:     Comments: The cervix is surgically absent.  Colposcopy of the vagina was performed.  There was a small amount of acetowhite epithelium at the vaginal cuff at its right margin.  This was  approximately 3 mm in size and it was friable on examination.  Biopsy was performed using the Cytobrush instrument.  This was treated with Monsel solution to hemostasis.  No other acetowhite lesions were visible.  The patient tolerated the procedure well.  Neurological:      Mental Status: She is alert and oriented to person, place, and time.         Assessment    Diagnoses and all orders for this visit:    1. ASCUS of cervix with negative high risk HPV        Plan  1. Vaginal colposcopy was performed as described above.  The patient tolerated the procedure well.    2. No follow-ups on file.    Social History     Tobacco Use   Smoking Status Former Smoker   • Types: Cigarettes   • Quit date: 2017   • Years since quittin.5   Tobacco Comment    Occasional   3.

## 2021-09-20 ENCOUNTER — OFFICE VISIT (OUTPATIENT)
Dept: GYNECOLOGIC ONCOLOGY | Facility: CLINIC | Age: 46
End: 2021-09-20

## 2021-09-20 ENCOUNTER — TELEPHONE (OUTPATIENT)
Dept: GYNECOLOGIC ONCOLOGY | Facility: CLINIC | Age: 46
End: 2021-09-20

## 2021-09-20 VITALS
RESPIRATION RATE: 18 BRPM | TEMPERATURE: 97 F | OXYGEN SATURATION: 100 % | BODY MASS INDEX: 29.36 KG/M2 | DIASTOLIC BLOOD PRESSURE: 74 MMHG | SYSTOLIC BLOOD PRESSURE: 105 MMHG | HEART RATE: 87 BPM | HEIGHT: 72 IN | WEIGHT: 216.8 LBS

## 2021-09-20 DIAGNOSIS — C7A.8 NEUROENDOCRINE CARCINOMA OF CERVIX (HCC): Primary | ICD-10-CM

## 2021-09-20 PROCEDURE — 57100 BIOPSY VAGINAL MUCOSA SIMPLE: CPT | Performed by: OBSTETRICS & GYNECOLOGY

## 2021-09-20 PROCEDURE — 99213 OFFICE O/P EST LOW 20 MIN: CPT | Performed by: OBSTETRICS & GYNECOLOGY

## 2021-09-20 NOTE — TELEPHONE ENCOUNTER
----- Message from Nayely Guerra RN sent at 9/20/2021 10:50 AM EDT -----  Regarding: FW: Test Results Question  Contact: [phone]  Can we get Ms. Bear scheduled with us ASAP?    Thanks!  ----- Message -----  From: Ramona Marianela  Sent: 9/20/2021  10:19 AM EDT  To: [facility]  Subject: Test Results Question                            My test results came back from . 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 He directed me to reach out to you. Thanks

## 2021-09-20 NOTE — PROGRESS NOTES
Age: 46 y.o.  Sex: female  :  1975  MRN: [id number]       REFERRING PHYSICIAN: No ref. provider found  DATE OF VISIT: 2021        Ramona Bear presents to [facility] for ongoing surveillance of FIGO IB2, NEC cervix. She compelted therapy in 2018 and has remained PATRICIA since that time. She denies c/o, no bleeding, pain etc.  Recently had ASCUS on pap followed by colpo with Cheo Salazar. At the time of exam there was friable tissue noted at the right vaginal cuff. Last CT A/P 21 showed PATRICIA.       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       • 18: Pelvic US: uterus- 9.19 x 5.55 x 5.26 cm; ET 0.96 cm; BL ovaries WNL. Uterine fibroid1.55 x 1.34 cm.   • 18: Cervical polyp BX: Small cell neuroendocrine carcinoma of cervix. Stains positive for synaptophysin, and CD56, confirming the neuroendocrine nature of the malignancy. IHC for p16 is negative, confirming absence of squamous differentiation. p16 is diffusely and strongly positive, suggesting an HPV driven process.   • 18: PET- a 1 cm rounded area of focal hot activity in the vaginal vault. There is a small amount of nonspecific mildly hypermetabolic activity throughout the region of the endometrial cavity.There is a small amount of symmetrical moderately hypermetabolic activity involving the vaginal labia without corresponding pathology identified on the CT images.   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 P16, synaptophysin, pancytokeratin AE1/ AE3 positive, with rare  "p63 positivity within the neuroendocrine component; Ki-67 strong at 100%.   • 9/4/18- 11/14/18: Cisplatin/ Etoposide x 4C (Cisplatin 75mg/m2 d1, Etoposide 100mg/m2 d1-3, q21d)  • 9/25/18: Caris results- MSI stable, Low Tumor mutation burden, Positive for MLH1, MSH2, MSH6, PMS2, PD-1, TS, RRM1, TOPO1, ER, NM, TOP2A.  • 10/14/20: CT CAP- PATRICIA  • 2/12/21: CT CAP- PATRICIA  • 7/14/21: CT CAP-PATRICIA  • 7/22/21: Pap smear-ASCUS, HPV (-)  • 9/16/21: Right side vaginal apex 9:00 bx-focal low-grade squamous intraepithelial lesion (VAIN I) A few minute benign glandular fragments are also noted.     9/20/21: Bx follow up        Mammogram-negative (2018)  CSP-scheduled for 12/2021           MEDICATIONS:  No current outpatient medications on file.    ALLERGIES:  No Known Allergies      ROS:  CONSTITUTIONAL:  Denies fever or chills.   NEUROLOGIC:  Denies headache, focal weakness or sensory changes.   EYES:  Denies change in visual acuity.  HEENT:  Denies nasal congestion or sore throat.   RESPIRATORY:  Denies cough or shortness of breath.   CARDIOVASCULAR:  Denies chest pain or edema.   GI:  Denies abdominal pain, nausea, vomiting, bloody stools or diarrhea.   :  Denies dysuria, leaking or incontinence.  MUSCULOSKELETAL:  Denies back pain or joint pain.   INTEGUMENT:  Denies rash.   ENDOCRINE:  Denies polyuria or polydipsia.   LYMPHATIC:  Denies swollen glands or lymphedema.   PSYCHIATRIC:  Denies depression or anxiety.      PHYSICAL EXAM:  Vitals:    09/20/21 1302   BP: 105/74   Pulse: 87   Resp: 18   Temp: 97 °F (36.1 °C)   TempSrc: Temporal   SpO2: 100%   Weight: 98.3 kg (216 lb 12.8 oz)   Height: 185.4 cm (72.99\")   PainSc: 0-No pain     Body mass index is 28.61 kg/m².  Current Status 9/20/2021   ECOG score 0     PHQ-9 Total Score:         GEN: alert and oriented x 3, normal affect, well nourished and hydrated.  CARDIO: regular rate and rhythm.  PULM: Lungs CTA b.l, no RRW   ABD: Soft, nontender, nondistended.   GYN: External " genitalia normal, no lesions, speculum exam without lesions, cuff visualized and previous site of monsels/biopsy with irritatoin, no mark tumor but this area was biopsied. Bimanual exam does not reveal any palpable lesions.   EXT: No petechiae, bruising, rash, candida. No CCE.       Result Review :  The following data was reviewed by: Catrachita David DO on 08/09/2021:  LABS:   WBC   Date Value Ref Range Status   01/10/2020 3.50 (L) 4.5 - 11.0 10*3/uL Final     RBC   Date Value Ref Range Status   01/10/2020 4.46 4.0 - 5.2 10*6/uL Final     Hemoglobin   Date Value Ref Range Status   01/10/2020 12.3 12.0 - 16.0 g/dL Final     Hematocrit   Date Value Ref Range Status   01/10/2020 37.4 36.0 - 46.0 % Final     Platelets   Date Value Ref Range Status   01/10/2020 222 140 - 440 10*3/uL Final     No results found for:         IMAGING:    IMPRESSION: Stable exam, with no evidence of recurrent or metastatic disease in the abdomen or pelvis.     Dictated by: Shaan King M.D.     Images and Report reviewed and interpreted by: Shaan King M.D.     <PS><Electronically signed by: Shaan King M.D.>   07/14/2021 1611     D: 07/14/2021 1606   T: 07/14/2021 1606          ASSESSMENT :  • H/o FIGO IB2 NEC cervix  - PATRICIA since 11/2018.  - Normal CT   • Right vaginal cuff friable tissue on exam - biopsied today x 3      PLAN :  • Will call patient with result of biopsy  • Obtain CT scan and follow up visit beginning of December 2021  • Instructed to call if she develops symptoms prior to next visit.                       XIOMARA MelendezO  9/20/2021    Gynecologic Oncology   [address] [place] [address]  [phone] office

## 2021-09-22 LAB
DX ICD CODE: NORMAL
PATH REPORT.FINAL DX SPEC: NORMAL
PATH REPORT.GROSS SPEC: NORMAL
PATH REPORT.SITE OF ORIGIN SPEC: NORMAL
PATHOLOGIST NAME: NORMAL
PAYMENT PROCEDURE: NORMAL

## 2021-09-30 ENCOUNTER — OFFICE VISIT (OUTPATIENT)
Dept: NEUROLOGY | Facility: CLINIC | Age: 46
End: 2021-09-30

## 2021-09-30 VITALS
HEIGHT: 72 IN | SYSTOLIC BLOOD PRESSURE: 126 MMHG | DIASTOLIC BLOOD PRESSURE: 72 MMHG | BODY MASS INDEX: 28.61 KG/M2 | HEART RATE: 86 BPM | OXYGEN SATURATION: 97 %

## 2021-09-30 DIAGNOSIS — G44.209 TENSION HEADACHE: Primary | ICD-10-CM

## 2021-09-30 DIAGNOSIS — C7A.8 NEUROENDOCRINE CARCINOMA OF CERVIX (HCC): ICD-10-CM

## 2021-09-30 DIAGNOSIS — F41.1 GENERALIZED ANXIETY DISORDER: ICD-10-CM

## 2021-09-30 PROCEDURE — 99203 OFFICE O/P NEW LOW 30 MIN: CPT | Performed by: NURSE PRACTITIONER

## 2021-09-30 NOTE — PROGRESS NOTES
Subjective:     Patient ID: Ramona Bear is a 46 y.o. female presenting for evaluation for headaches.  The patient states that she noticed an increase in the frequency of her headaches when she started treatment for cervical cancer in 2018.  She says she currently gets a headache that may last a few days at a time every few weeks.  The pain is global and is described as a mild aching type pain.  She denies any nausea, photophobia, phonophobia.  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  She says normally she takes 1 Tylenol and this will alleviate her pain.  Occasionally the headache will return.  She does report quite a bit of anxiety and says she has always been this way but it has worsened since her cervical cancer diagnosis.    She denies drug use.  Denies alcohol use.  Denies tobacco use.    She does report a history of tingling in her hands and feet and says this has happened since she received chemotherapy.  Usually occurs in the morning when she first wakes up and improves as she gets up and walks around.  She also reports hearing loss that has been present since she received the chemotherapy as well.      She is not on any prescription medications at this time.    History of Present Illness  The following portions of the patient's history were reviewed and updated as appropriate: allergies, current medications, past family history, past medical history, past social history, past surgical history and problem list.    Review of Systems   Constitutional: Positive for fatigue. Negative for chills and fever.   HENT: Positive for hearing loss and tinnitus. Negative for trouble swallowing.    Eyes: Positive for photophobia and visual disturbance.   Respiratory: Negative for cough, shortness of breath and wheezing.    Cardiovascular: Negative for chest pain, palpitations and leg swelling.   Gastrointestinal: Negative for  diarrhea, nausea and vomiting.   Endocrine: Negative for cold intolerance, heat intolerance and polydipsia.   Genitourinary: Negative for decreased urine volume, difficulty urinating and urgency.   Musculoskeletal: Positive for neck pain and neck stiffness. Negative for back pain.   Skin: Negative for color change, rash and wound.   Allergic/Immunologic: Negative for environmental allergies, food allergies and immunocompromised state.   Neurological: Positive for dizziness, weakness (feet), numbness (feet) and headaches. Negative for tremors, seizures, syncope, facial asymmetry, speech difficulty and light-headedness.   Hematological: Negative for adenopathy. Does not bruise/bleed easily.   Psychiatric/Behavioral: Negative for confusion and sleep disturbance. The patient is nervous/anxious.         Objective:    Neurologic Exam  General: Well nourished, well developed, and in no acute distress.  HEENT: Normocephalic/atraumatic. Mucous membranes moist. Sclerae anicteric.   Heart: Regular rate and rhythm. No murmurs, rubs or gallops.  Lungs: Clear to auscultation bilaterally.  Skin: No notable rashes or lesions on the visible surfaces.   Extremities: No clubbing, cyanosis or significant edema.   Psychiatric: Pleasant, cooperative, and appropriate.   Neurologic Exam:  Mental Status:  Alert and oriented. Speech is fluent. Comprehension is intact.   Cranial Nerves II-XII: Pupils equal, round, reactive to light. Extraocular movements are full and conjugate in all directions. Pursuit movements do not provoke any apparent dizziness or discomfort.  No nystagmus noted.  Hearing is intact to voice. Facial strength and sensation are preserved and symmetric. Tongue and palate midline. Voice non-hoarse, non-dysarthric.   Motor: Normal bulk and tone of bilateral upper and lower extremities. Strength is 5/5 in all 4 extremities both proximally and distally. There are no abnormal or involuntary movements noted.  Sensation: Intact to  light touch throughout. Romberg was negative with no significant sway.   Coordination: Fully intact. Finger-to-nose performed accurately bilaterally.  Reflexes: The deep tendon reflexes are 2+/4 in bilateral biceps, brachioradialis, triceps, patella, and Achilles.  No pathologic reflexes were noted.  Gait: Normal. No ataxia or apraxia.         Physical Exam    Assessment/Plan:     Diagnoses and all orders for this visit:    1. Tension headache (Primary)  -     MRI Brain With & Without Contrast; Future    2. Neuroendocrine carcinoma of cervix (CMS/HCC)    3. Generalized anxiety disorder     The patient's headaches are most consistent with tension headaches. Likely due to anxiety regarding her cervical cancer history. I ordered an MRI brain and if this is unremarkable I recommended continuing to treat with Tylenol. This is currently relieving the headache with only 1 Tylenol. She can take 2 if headache tends to return. If this becomes less effective she is to call and I can prescribe her something else. Will contact her with MRI results once available. Depending on results will determine if follow up is necessary and we can discuss this at that time.

## 2021-11-02 ENCOUNTER — HOSPITAL ENCOUNTER (OUTPATIENT)
Dept: MRI IMAGING | Facility: HOSPITAL | Age: 46
Discharge: HOME | End: 2021-11-02
Admitting: NURSE PRACTITIONER

## 2021-11-02 DIAGNOSIS — G44.209 TENSION HEADACHE: ICD-10-CM

## 2021-11-02 PROCEDURE — A9577 INJ MULTIHANCE: HCPCS | Performed by: NURSE PRACTITIONER

## 2021-11-02 PROCEDURE — 70553 MRI BRAIN STEM W/O & W/DYE: CPT

## 2021-11-02 PROCEDURE — 0 GADOBENATE DIMEGLUMINE 529 MG/ML SOLUTION: Performed by: NURSE PRACTITIONER

## 2021-11-02 RX ADMIN — GADOBENATE DIMEGLUMINE 20 ML: 529 INJECTION, SOLUTION INTRAVENOUS at 07:16

## 2021-11-14 ENCOUNTER — APPOINTMENT (OUTPATIENT)
Dept: GENERAL RADIOLOGY | Facility: HOSPITAL | Age: 46
End: 2021-11-14

## 2021-11-14 ENCOUNTER — HOSPITAL ENCOUNTER (EMERGENCY)
Facility: HOSPITAL | Age: 46
Discharge: HOME | End: 2021-11-14
Attending: EMERGENCY MEDICINE | Admitting: EMERGENCY MEDICINE

## 2021-11-14 VITALS
TEMPERATURE: 97.7 F | OXYGEN SATURATION: 99 % | RESPIRATION RATE: 18 BRPM | BODY MASS INDEX: 27.1 KG/M2 | DIASTOLIC BLOOD PRESSURE: 72 MMHG | SYSTOLIC BLOOD PRESSURE: 107 MMHG | HEIGHT: 72 IN | HEART RATE: 98 BPM

## 2021-11-14 DIAGNOSIS — S83.91XA SPRAIN OF RIGHT KNEE, UNSPECIFIED LIGAMENT, INITIAL ENCOUNTER: Primary | ICD-10-CM

## 2021-11-14 PROCEDURE — 99283 EMERGENCY DEPT VISIT LOW MDM: CPT

## 2021-11-14 PROCEDURE — 73560 X-RAY EXAM OF KNEE 1 OR 2: CPT

## 2021-11-14 NOTE — ED PROVIDER NOTES
EMERGENCY DEPARTMENT ENCOUNTER  Patient was placed in face mask in first look and the following protective measures were taken unless  documented below in the ED course. Patient was wearing facemask when I entered the room and throughout our encounter. I wore full protective equipment throughout this patient encounter including a N95 mask, eye shield, gown and gloves. Hand hygiene was performed before donning protective equipment and after removal when leaving the room.    Room Number:  33/33  Date of encounter:  11/14/2021  PCP: Alona Cruz APRN    HPI:  Context: Ramona Bear is a 46 y.o. female who presents to the ED c/o chief complaint of increasing pain to her right knee since yesterday.  Patient does not recall an injury but she states she has a large, rambunctious puppy at home.  Patient states it hurts to bear weight on her right knee and she noticed a pressure-like sensation to the back and inside of her right knee.  He denies chest pain or shortness of air.  She denies recent travel.  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  She has not been on chemotherapy for the past 3 years.  She went to an urgent care today and they referred her to the emergency department for further evaluation and treatment.    MEDICAL HISTORY REVIEW  Reviewed in EPIC    PAST MEDICAL HISTORY  Active Ambulatory Problems     Diagnosis Date Noted   • Neuroendocrine carcinoma of cervix (HCC) 07/23/2018   • Migraine without aura and without status migrainosus, not intractable 07/29/2019   • Generalized anxiety disorder 08/08/2018   • Encounter for screening for malignant neoplasm of colon 08/12/2021     Resolved Ambulatory Problems     Diagnosis Date Noted   • No Resolved Ambulatory Problems     Past Medical History:   Diagnosis Date   • Anxiety    • BRCA negative    • Cancer (CMS/HCC)    • Cervical dysplasia    • Environmental allergies    • Fibroid, uterine    •  Headache, tension-type    • HL (hearing loss)    • Peripheral neuropathy        PAST SURGICAL HISTORY  Past Surgical History:   Procedure Laterality Date   • D & C WITH SUCTION     • EYE SURGERY     • LAPAROSCOPIC TUBAL LIGATION     • LASER ABLATION OF THE CERVIX         FAMILY HISTORY  Family History   Problem Relation Age of Onset   • Diabetes Father    • Hypertension Father    • Breast cancer Paternal Aunt 45   • Breast cancer Paternal Aunt 45       SOCIAL HISTORY  Social History     Socioeconomic History   • Marital status:    Tobacco Use   • Smoking status: Former Smoker     Types: Cigarettes     Quit date: 2017     Years since quittin.7   • Smokeless tobacco: Never Used   • Tobacco comment: Occasional   Vaping Use   • Vaping Use: Never used   Substance and Sexual Activity   • Alcohol use: Yes     Comment: social   • Drug use: No   • Sexual activity: Yes     Partners: Male     Birth control/protection: Surgical       ALLERGIES  Patient has no known allergies.    The patient's allergies have been reviewed    REVIEW OF SYSTEMS  All systems reviewed and negative except for those discussed in HPI.     PHYSICAL EXAM  I have reviewed the triage vital signs and nursing notes.  ED Triage Vitals   Temp Heart Rate Resp BP SpO2   21 1746 21 1746 21 1746 21 1843 21 174   97.7 °F (36.5 °C) 98 18 107/72 99 %      Temp src Heart Rate Source Patient Position BP Location FiO2 (%)   21 1746 21 1746 21 1846 21 184 --   Tympanic Monitor Lying Right arm          General: Mild distress.  HENT: NCAT, PERRL, Nares patent.  Eyes: no scleral icterus.  Neck: trachea midline, no ROM limitations.  CV: regular rhythm, regular rate.  Respiratory: normal effort, CTAB.  Abdomen: Deferred  : deferred.  Musculoskeletal: no deformity.  Patient has mild tenderness along the right LCL and MCL.  She has point tenderness to the anteromedial aspect of her right knee.  She has  pain with flexion of her right knee.  She has a small amount of swelling on the anterior aspect of her right knee.  She does not have calf tenderness.  Right medial thigh is nontender to palpation.  She has a negative lever sign on the right.  She has a negative straight leg raise bilaterally.  Her right leg is not larger than the left leg.  Neuro: alert, moves all extremities, follows commands.  Skin: warm, dry.    LAB RESULTS  No results found for this or any previous visit (from the past 24 hour(s)).    I ordered the above labs and reviewed the results.    RADIOLOGY  XR Knee 1 or 2 View Right    Result Date: 11/14/2021  2 VIEWS RIGHT KNEE  HISTORY: Right knee pain  COMPARISON: None available.  FINDINGS: No acute fracture or subluxation of the right knee is seen. There is minimal degenerative change. No suprapatellar effusion is seen.      No acute findings.  This report was finalized on 11/14/2021 7:11 PM by Dr. Lacie Bello M.D.        I ordered the above noted radiological studies. I reviewed the images and results. I agree with the radiologist interpretation.    PROCEDURES  Procedures    MEDICATIONS GIVEN IN ER  Medications - No data to display    PROGRESS, DATA ANALYSIS, CONSULTS, AND MEDICAL DECISION MAKING  A complete history and physical exam have been performed.  All available laboratory and imaging results have been reviewed by myself prior to disposition.    MDM  After the initial H&P, I discussed pertinent information from history and physical exam with patient/family.  Discussed differential diagnosis.  Discussed plan for ED evaluation/work-up/treatment.  All questions answered.  Patient/family is agreeable with plan.  ED Course as of 11/14/21 2229   Sun Nov 14, 2021   7808 I do not think that patient has a DVT in the right leg.  I suspect patient has a right knee strain.  We will get a x-ray of the right knee. [DE]   2006 I updated patient on the results of her x-ray.  She will be discharged  with a knee brace. [DE]      ED Course User Index  [DE] Mendoza Vidal MD       AS OF 22:29 EST VITALS:    BP - 107/72  HR - 98  TEMP - 97.7 °F (36.5 °C) (Tympanic)  O2 SATS - 99%    DIAGNOSIS  Final diagnoses:   Sprain of right knee, unspecified ligament, initial encounter         DISPOSITION    DISCHARGE    Patient discharged in stable condition.    Reviewed implications of results, diagnosis, meds, responsibility to follow up, warning signs and symptoms of possible worsening, potential complications and reasons to return to ER.    Patient/Family voiced understanding of above instructions.    Discussed plan for discharge, as there is no emergent indication for admission. Patient referred to primary care provider for BP management due to today's BP. Pt/family is agreeable and understands need for follow up and repeat testing.  Pt is aware that discharge does not mean that nothing is wrong but it indicates no emergency is present that requires admission and they must continue care with follow-up as given below or physician of their choice.     FOLLOW-UP  Alona Cruz, APRN  [address] [id number] [phone]      As needed    Gerardo Rosario MD  [address] [id number] [phone]    Schedule an appointment as soon as possible for a visit   If symptoms worsen         Medication List      No changes were made to your prescriptions during this visit.          Mendoza Vidal MD  11/14/21 8425

## 2021-11-14 NOTE — ED TRIAGE NOTES
Pt states that for the last 2 days she has had pain and swelling in her right knee. Denies any known injury. Patient masked at arrival and triage staff wore all appropriate PPE during entire encounter with patient.

## 2021-11-15 NOTE — DISCHARGE INSTRUCTIONS
Apply cool compresses to the knee for up to 20 minutes at a time.  Use over-the-counter Tylenol or ibuprofen as needed for pain.  Wear the knee brace for 1 week.  Follow-up with Dr. Rosario if symptoms do not improve within the next 3 to 4 days.

## 2021-11-15 NOTE — ED NOTES
Pt was in mask through out encounter. This ERT was in proper PPE through out encounter.      Peyton Rhodes, PCT  11/14/21 0332

## 2021-11-30 ENCOUNTER — OFFICE VISIT (OUTPATIENT)
Dept: ORTHOPEDIC SURGERY | Facility: CLINIC | Age: 46
End: 2021-11-30

## 2021-11-30 VITALS — TEMPERATURE: 97.8 F | WEIGHT: 215 LBS | HEIGHT: 72 IN | BODY MASS INDEX: 29.12 KG/M2

## 2021-11-30 DIAGNOSIS — S89.91XA INJURY OF RIGHT KNEE, INITIAL ENCOUNTER: Primary | ICD-10-CM

## 2021-11-30 PROCEDURE — 99214 OFFICE O/P EST MOD 30 MIN: CPT | Performed by: NURSE PRACTITIONER

## 2021-11-30 NOTE — PROGRESS NOTES
"Patient: Ramona Bear  YOB: 1975 46 y.o. female  Medical Record Number: [id number]    Chief Complaints:   Chief Complaint   Patient presents with   • Right Knee - Initial Evaluation       History of Present Illness:Ramona Bear is a 46 y.o. female who presents as a new patient both myself as well as to the practice with complaints of severe right knee pain and swelling.  Apparently the patient twisted her right knee 8 days ago had acute onset of pain and has had a difficult time walking since.  She went to the emergency room and at that time was told nothing was broken and was referred here to be seen as soon as possible.  Patient describes the knee pain as a moderate to severe constant sharp type pain with any type of bending also with locking catching feels like the knee is going to give way.  She has tried anti-inflammatories with minimal improvement    Allergies: No Known Allergies    Medications:   No current outpatient medications on file.     No current facility-administered medications for this visit.         The following portions of the patient's history were reviewed and updated as appropriate: allergies, current medications, past family history, past medical history, past social history, past surgical history and problem list.    Review of Systems:   A 14 point review of systems was performed. All systems negative except pertinent positives/negative listed in HPI above    Physical Exam:   Vitals:    11/30/21 0901   Temp: 97.8 °F (36.6 °C)   Weight: 97.5 kg (215 lb)   Height: 190.5 cm (75\")       General: A and O x 3, ASA, NAD    SCLERA:    Normal    Skin clear no unusual lesions noted  Right knee patient has 2+ effusion noted with 110 degrees flexion neutral in extension with a positive Jhoan negative Lockman calf soft and nontender       Radiology:  Xrays of his x-rays of the right knee were reviewed and show minimal if any arthritic changes.    Assessment/Plan: Severe right knee " pain with large effusion and mechanical symptoms following injury    Patient I discussed options, we will proceed with an MRI of the right knee to further evaluate and the patient will call couple days after regarding results and treatment options she will continue with compression, ice, rest, elevation.      Joanne Smith, APRN  11/30/2021

## 2021-12-01 ENCOUNTER — HOSPITAL ENCOUNTER (OUTPATIENT)
Dept: CT IMAGING | Facility: HOSPITAL | Age: 46
Discharge: HOME | End: 2021-12-01
Admitting: OBSTETRICS & GYNECOLOGY

## 2021-12-01 DIAGNOSIS — C7A.8 NEUROENDOCRINE CARCINOMA OF CERVIX (HCC): ICD-10-CM

## 2021-12-01 LAB — CREAT BLDA-MCNC: 1 MG/DL (ref 0.6–1.3)

## 2021-12-01 PROCEDURE — 82565 ASSAY OF CREATININE: CPT

## 2021-12-01 PROCEDURE — 71260 CT THORAX DX C+: CPT

## 2021-12-01 PROCEDURE — 0 DIATRIZOATE MEGLUMINE & SODIUM PER 1 ML: Performed by: OBSTETRICS & GYNECOLOGY

## 2021-12-01 PROCEDURE — 74177 CT ABD & PELVIS W/CONTRAST: CPT

## 2021-12-01 PROCEDURE — 25010000002 IOPAMIDOL 61 % SOLUTION: Performed by: OBSTETRICS & GYNECOLOGY

## 2021-12-01 RX ADMIN — DIATRIZOATE MEGLUMINE AND DIATRIZOATE SODIUM 30 ML: 600; 100 SOLUTION ORAL; RECTAL at 08:38

## 2021-12-01 RX ADMIN — IOPAMIDOL 85 ML: 612 INJECTION, SOLUTION INTRAVENOUS at 09:34

## 2021-12-10 ENCOUNTER — HOSPITAL ENCOUNTER (OUTPATIENT)
Facility: HOSPITAL | Age: 46
Setting detail: HOSPITAL OUTPATIENT SURGERY
Discharge: HOME | End: 2021-12-10
Attending: INTERNAL MEDICINE | Admitting: INTERNAL MEDICINE

## 2021-12-10 ENCOUNTER — ANESTHESIA (OUTPATIENT)
Dept: GASTROENTEROLOGY | Facility: HOSPITAL | Age: 46
End: 2021-12-10

## 2021-12-10 ENCOUNTER — ANESTHESIA EVENT (OUTPATIENT)
Dept: GASTROENTEROLOGY | Facility: HOSPITAL | Age: 46
End: 2021-12-10

## 2021-12-10 VITALS
RESPIRATION RATE: 16 BRPM | DIASTOLIC BLOOD PRESSURE: 69 MMHG | BODY MASS INDEX: 29.53 KG/M2 | WEIGHT: 218 LBS | HEIGHT: 72 IN | OXYGEN SATURATION: 99 % | HEART RATE: 74 BPM | SYSTOLIC BLOOD PRESSURE: 104 MMHG

## 2021-12-10 PROCEDURE — G0121 COLON CA SCRN NOT HI RSK IND: HCPCS | Performed by: INTERNAL MEDICINE

## 2021-12-10 PROCEDURE — 25010000002 PROPOFOL 10 MG/ML EMULSION: Performed by: ANESTHESIOLOGY

## 2021-12-10 PROCEDURE — S0260 H&P FOR SURGERY: HCPCS | Performed by: INTERNAL MEDICINE

## 2021-12-10 RX ORDER — SODIUM CHLORIDE, SODIUM LACTATE, POTASSIUM CHLORIDE, CALCIUM CHLORIDE 600; 310; 30; 20 MG/100ML; MG/100ML; MG/100ML; MG/100ML
30 INJECTION, SOLUTION INTRAVENOUS CONTINUOUS PRN
Status: DISCONTINUED | OUTPATIENT
Start: 2021-12-10 | End: 2021-12-10 | Stop reason: HOSPADM

## 2021-12-10 RX ORDER — LIDOCAINE HYDROCHLORIDE 20 MG/ML
INJECTION, SOLUTION INFILTRATION; PERINEURAL AS NEEDED
Status: DISCONTINUED | OUTPATIENT
Start: 2021-12-10 | End: 2021-12-10 | Stop reason: SURG

## 2021-12-10 RX ORDER — PROPOFOL 10 MG/ML
VIAL (ML) INTRAVENOUS AS NEEDED
Status: DISCONTINUED | OUTPATIENT
Start: 2021-12-10 | End: 2021-12-10 | Stop reason: SURG

## 2021-12-10 RX ORDER — PROPOFOL 10 MG/ML
VIAL (ML) INTRAVENOUS CONTINUOUS PRN
Status: DISCONTINUED | OUTPATIENT
Start: 2021-12-10 | End: 2021-12-10 | Stop reason: SURG

## 2021-12-10 RX ADMIN — Medication 100 MG: at 13:07

## 2021-12-10 RX ADMIN — LIDOCAINE HYDROCHLORIDE 60 MG: 20 INJECTION, SOLUTION INFILTRATION; PERINEURAL at 13:06

## 2021-12-10 RX ADMIN — SODIUM CHLORIDE, POTASSIUM CHLORIDE, SODIUM LACTATE AND CALCIUM CHLORIDE: 600; 310; 30; 20 INJECTION, SOLUTION INTRAVENOUS at 12:45

## 2021-12-10 RX ADMIN — PROPOFOL 200 MCG/KG/MIN: 10 INJECTION, EMULSION INTRAVENOUS at 13:06

## 2021-12-10 NOTE — H&P
"[facility]  Pre Procedure History & Physical    Chief Complaint:   Time for my colonoscopy    Subjective     HPI:   46 y.o. female     Past Medical History:   Past Medical History:   Diagnosis Date   • Anxiety    • BRCA negative    • Cancer (HCC)     SMALL CELL NEUROENDOCRINE CARCINOMA   • Environmental allergies    • Headache, tension-type    • History of chemotherapy 02/2019   • History of radiation therapy 02/2019   • HL (hearing loss)    • Peripheral neuropathy    • Slow to wake up after anesthesia     AFTER HAVING PORT PLACEMENT AND REMOVAL, [facility]KE[facility]'S , TOOK OVER 2 HOURS POST OP       Family History:  Family History   Problem Relation Age of Onset   • Diabetes Father    • Hypertension Father    • Breast cancer Paternal Aunt 45   • Breast cancer Paternal Aunt 45   • Malig Hyperthermia Neg Hx        Social History:   reports that she quit smoking about 4 years ago. Her smoking use included cigarettes. She has never used smokeless tobacco. She reports current alcohol use. She reports that she does not use drugs.    Medications:   No medications prior to admission.       Allergies:  Patient has no known allergies.    ROS:    Pertinent items are noted in HPI     Objective     Blood pressure 114/64, pulse 75, resp. rate 16, height 190.5 cm (75\"), weight 98.9 kg (218 lb), last menstrual period 06/22/2018, SpO2 98 %, not currently breastfeeding.    Physical Exam   Constitutional: Pt is oriented to person, place, and time and well-developed, well-nourished, and in no distress.   Abdominal: Soft.   Psychiatric: Mood, memory, affect and judgment normal.     Assessment/Plan     Diagnosis:  Encounter for screening for colon cancer    Anticipated Surgical Procedure:  Colonoscopy    The risks, benefits, and alternatives of this procedure have been discussed with the patient or the responsible party- the patient understands and agrees to " proceed.

## 2021-12-10 NOTE — DISCHARGE INSTRUCTIONS
For the next 24 hours patient needs to be with a responsible adult.    For 24 hours DO NOT drive, operate machinery, appliances, drink alcohol, make important decisions or sign legal documents.    Start with a light or bland diet and advance to regular diet as tolerated.    Follow recommendations on procedure report provided by your doctor.    Call Dr Tom for problems [phone]    Problems may include but not limited to: large amounts of bleeding, trouble breathing, repeated vomiting, severe unrelieved pain, fever or chills.

## 2021-12-10 NOTE — ANESTHESIA PREPROCEDURE EVALUATION
Anesthesia Evaluation     Patient summary reviewed and Nursing notes reviewed   no history of anesthetic complications:  NPO Solid Status: > 6 hours  NPO Liquid Status: > 6 hours           Airway   Mallampati: II  TM distance: >3 FB  Neck ROM: full  no difficulty expected and No difficulty expected  Dental - normal exam     Pulmonary - negative pulmonary ROS and normal exam    breath sounds clear to auscultation  (-) rhonchi, decreased breath sounds, wheezes, rales, stridor  Cardiovascular - negative cardio ROS and normal exam    NYHA Classification: I  Rhythm: regular  Rate: normal    (-) murmur, weak pulses, friction rub, systolic click, carotid bruits, JVD, peripheral edema      Neuro/Psych  (+) psychiatric history Anxiety,     GI/Hepatic/Renal/Endo - negative ROS     Musculoskeletal (-) negative ROS    Abdominal  - normal exam    Abdomen: soft.   Substance History - negative use     OB/GYN negative ob/gyn ROS         Other      history of cancer remission                    Anesthesia Plan    ASA 2     MAC     intravenous induction     Anesthetic plan, all risks, benefits, and alternatives have been provided, discussed and informed consent has been obtained with: patient.

## 2021-12-10 NOTE — ANESTHESIA POSTPROCEDURE EVALUATION
"Patient: Ramona Bear    Procedure Summary     Date: 12/10/21 Room / Location:  [facility]    Anesthesia Start: 1302 Anesthesia Stop: 1327    Procedure: COLONOSCOPY into cecum (N/A ) Diagnosis:       Encounter for screening for malignant neoplasm of colon      (Encounter for screening for malignant neoplasm of colon [Z12.11])    Surgeons: Tin Tom MD Provider: Kris Carson MD    Anesthesia Type: MAC ASA Status: 2          Anesthesia Type: MAC    Vitals  Vitals Value Taken Time   BP 95/57 12/10/21 1330   Temp     Pulse 72 12/10/21 1330   Resp 16 12/10/21 1330   SpO2 98 % 12/10/21 1330           Post Anesthesia Care and Evaluation    Patient location during evaluation: bedside  Patient participation: complete - patient participated  Level of consciousness: awake and alert  Pain management: adequate  Airway patency: patent  Anesthetic complications: No anesthetic complications    Cardiovascular status: acceptable  Respiratory status: acceptable  Hydration status: acceptable    Comments: BP 95/57 (BP Location: Left arm, Patient Position: Lying)   Pulse 72   Resp 16   Ht 190.5 cm (75\")   Wt 98.9 kg (218 lb)   LMP 06/22/2018 (Approximate)   SpO2 98%   BMI 27.25 kg/m²           "

## 2021-12-13 ENCOUNTER — OFFICE VISIT (OUTPATIENT)
Dept: GYNECOLOGIC ONCOLOGY | Facility: CLINIC | Age: 46
End: 2021-12-13

## 2021-12-13 VITALS
DIASTOLIC BLOOD PRESSURE: 74 MMHG | WEIGHT: 217.8 LBS | OXYGEN SATURATION: 97 % | HEIGHT: 72 IN | SYSTOLIC BLOOD PRESSURE: 115 MMHG | BODY MASS INDEX: 29.5 KG/M2 | HEART RATE: 73 BPM | RESPIRATION RATE: 22 BRPM

## 2021-12-13 DIAGNOSIS — C7A.8 NEUROENDOCRINE CARCINOMA OF CERVIX (HCC): Primary | ICD-10-CM

## 2021-12-13 PROCEDURE — 99213 OFFICE O/P EST LOW 20 MIN: CPT | Performed by: OBSTETRICS & GYNECOLOGY

## 2021-12-13 NOTE — PROGRESS NOTES
Age: 46 y.o.  Sex: female  :  1975  MRN: [id number]       REFERRING PHYSICIAN: No ref. provider found  DATE OF VISIT: 2021        Ramona Bear presents to [facility] for ongoing surveillance of FIGO IB2, NEC cervix. She completed therapy in 2018 and has remained PATRICIA since that time. Recent CT scan 2021 shows thickening at the post surgical bed and a 6mm pulm nodule. This was measured at 3mm last CT.  She denies c/o, no bleeding, pain etc.  Recently had ASCUS on pap followed by colpo with Cheo Salazar. At the time of exam there was friable tissue noted at the right vaginal cuff. Bx was negative.     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       • 18: Pelvic US: uterus- 9.19 x 5.55 x 5.26 cm; ET 0.96 cm; BL ovaries WNL. Uterine fibroid1.55 x 1.34 cm.   • 18: Cervical polyp BX: Small cell neuroendocrine carcinoma of cervix. Stains positive for synaptophysin, and CD56, confirming the neuroendocrine nature of the malignancy. IHC for p16 is negative, confirming absence of squamous differentiation. p16 is diffusely and strongly positive, suggesting an HPV driven process.   • 18: PET- a 1 cm rounded area of focal hot activity in the vaginal vault. There is a small amount of nonspecific mildly hypermetabolic activity throughout the region of the endometrial cavity.There is a small amount of symmetrical moderately hypermetabolic activity involving the vaginal labia without corresponding pathology identified on the CT images.   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 P16, synaptophysin, pancytokeratin AE1/ AE3 positive, with rare p63 positivity within the neuroendocrine component; Ki-67 strong at 100%.   • 9/4/18- 11/14/18: Cisplatin/ Etoposide x 4C (Cisplatin 75mg/m2 d1, Etoposide 100mg/m2 d1-3, q21d)  • 9/25/18: Caris results- MSI stable, Low Tumor mutation burden, Positive for MLH1, MSH2, MSH6, PMS2, PD-1, TS, RRM1, TOPO1, ER, MN, TOP2A.  • 10/14/20: CT CAP- PATRICIA  • 2/12/21: CT CAP- PATRICIA  • 7/14/21: CT CAP-PATRICIA  • 7/22/21: Pap smear-ASCUS, HPV (-)  • 9/16/21: Right side vaginal apex 9:00 bx-focal low-grade squamous intraepithelial lesion (VAIN I) A few minute benign glandular fragments are also noted.  • 12/01/21: CT CAP: Sub-6 mm pleural-based pulmonary nodule along the minor fissure, favored be benign. PATRICIA abdomen/pelvis.    12/13/21: 3 month follow up        Mammogram-negative (2018)  CSP-scheduled for 12/2021           MEDICATIONS:    Current Outpatient Medications:   •  Acetaminophen (TYLENOL EXTRA STRENGTH PO), Take  by mouth., Disp: , Rfl:     ALLERGIES:  No Known Allergies      ROS:  CONSTITUTIONAL:  Denies fever or chills.   NEUROLOGIC:  Denies headache, focal weakness or sensory changes.   EYES:  Denies change in visual acuity.  HEENT:  Denies nasal congestion or sore throat.   RESPIRATORY:  Denies cough or shortness of breath.   CARDIOVASCULAR:  Denies chest pain or edema.   GI:  Denies abdominal pain, nausea, vomiting, bloody stools or diarrhea.   :  Denies dysuria, leaking or incontinence.  MUSCULOSKELETAL:  Denies back pain or joint pain.   INTEGUMENT:  Denies rash.   ENDOCRINE:  Denies polyuria or polydipsia.   LYMPHATIC:  Denies swollen glands or lymphedema.   PSYCHIATRIC:  Denies depression or anxiety.      PHYSICAL EXAM:  Vitals:    12/13/21 0844   BP: 115/74   Pulse: 73   Resp: 22   SpO2: 97%   Weight: 98.8 kg (217 lb 12.8 oz)   Height: 190.5 cm (75\")   PainSc:   3   PainLoc: Knee  Comment: right     Body mass " index is 27.22 kg/m².  Current Status 12/13/2021   ECOG score 0     PHQ-9 Total Score:         GEN: alert and oriented x 3, normal affect, well nourished and hydrated.  CARDIO: regular rate and rhythm.  PULM: Lungs CTA b.l, no RRW   ABD: Soft, nontender, nondistended.   GYN: External genitalia normal, no lesions, speculum exam without lesions, cuff visualized and previous site of monsels/biopsy with irritatoin, no mark tumor but this area was biopsied. Bimanual exam does not reveal any palpable lesions.   EXT: No petechiae, bruising, rash, candida. No CCE.       Result Review :  The following data was reviewed by: Catrachita David DO on 08/09/2021:  LABS:   WBC   Date Value Ref Range Status   01/10/2020 3.50 (L) 4.5 - 11.0 10*3/uL Final     RBC   Date Value Ref Range Status   01/10/2020 4.46 4.0 - 5.2 10*6/uL Final     Hemoglobin   Date Value Ref Range Status   01/10/2020 12.3 12.0 - 16.0 g/dL Final     Hematocrit   Date Value Ref Range Status   01/10/2020 37.4 36.0 - 46.0 % Final     Platelets   Date Value Ref Range Status   01/10/2020 222 140 - 440 10*3/uL Final     No results found for:         IMAGING:    IMPRESSION: Stable exam, with no evidence of recurrent or metastatic disease in the abdomen or pelvis.     Dictated by: Shaan King M.D.     Images and Report reviewed and interpreted by: Shaan King M.D.     <PS><Electronically signed by: Shaan King M.D.>   07/14/2021 1611     D: 07/14/2021 1606   T: 07/14/2021 1606          ASSESSMENT :  • H/o FIGO IB2 NEC cervix  - PATRICIA since 11/2018.  - CT 12/21 w 6mm pulm nodule and thickening at old operative bed       PLAN :  • Reasurance given that CT findings are not worrisome but we will re scan in 3 months to ensure no growth.   • Ordered CT C/A/P 3/2022 prior to next follow up.   • Instructed to call if she develops symptoms prior to next visit.             XIOMARA MelendezO  12/13/2021    Gynecologic Oncology   [address]  [address]  [place], [place] [id number] [phone] office

## 2022-01-19 ENCOUNTER — HOSPITAL ENCOUNTER (OUTPATIENT)
Dept: MRI IMAGING | Facility: HOSPITAL | Age: 47
Discharge: HOME | End: 2022-01-19

## 2022-01-19 DIAGNOSIS — S89.91XA INJURY OF RIGHT KNEE, INITIAL ENCOUNTER: ICD-10-CM

## 2022-01-19 DIAGNOSIS — C53.9 MALIGNANT NEOPLASM OF CERVIX, UNSPECIFIED SITE: ICD-10-CM

## 2022-01-19 DIAGNOSIS — Z80.3 FH: BREAST CANCER: ICD-10-CM

## 2022-01-19 PROCEDURE — 73721 MRI JNT OF LWR EXTRE W/O DYE: CPT

## 2022-01-19 PROCEDURE — 0 GADOBENATE DIMEGLUMINE 529 MG/ML SOLUTION: Performed by: OBSTETRICS & GYNECOLOGY

## 2022-01-19 PROCEDURE — A9577 INJ MULTIHANCE: HCPCS | Performed by: OBSTETRICS & GYNECOLOGY

## 2022-01-19 PROCEDURE — 77049 MRI BREAST C-+ W/CAD BI: CPT

## 2022-01-19 RX ADMIN — GADOBENATE DIMEGLUMINE 20 ML: 529 INJECTION, SOLUTION INTRAVENOUS at 08:11

## 2022-01-20 DIAGNOSIS — S83.241A ACUTE MEDIAL MENISCUS TEAR OF RIGHT KNEE, INITIAL ENCOUNTER: Primary | ICD-10-CM

## 2022-02-16 NOTE — PROGRESS NOTES
New Right  Knee      Patient: Ramona Bear        YOB: 1975    Medical Record Number: [id number]        Chief Complaints: Right knee pain    46-year-old female who presents complaining of right knee pain she was sent here by Alona Smith she states she has an autistic son who she had to restrain at one point and also has a large daughter dog ran into her knee.  She did not know which one really injured her knee but she has had swelling and limitation of her function for quite some time.  But ongoing for a few months her symptoms currently are mild but they are much worse with any weightbearing and twisting and any significant activity her past medical history is remarkable for cervical cancer hearing loss history of chemotherapy peripheral neuropathy  History of Present Illness: This is a         Allergies: No Known Allergies    Medications:   Home Medications:  Current Outpatient Medications on File Prior to Visit   Medication Sig   • Acetaminophen (TYLENOL EXTRA STRENGTH PO) Take  by mouth.     No current facility-administered medications on file prior to visit.     Current Medications:  Scheduled Meds:  Continuous Infusions:No current facility-administered medications for this visit.    PRN Meds:.    Past Medical History:   Diagnosis Date   • Anxiety    • BRCA negative    • Cancer (HCC)     SMALL CELL NEUROENDOCRINE CARCINOMA   • Environmental allergies    • Headache, tension-type    • History of chemotherapy 02/2019   • History of radiation therapy 02/2019   • HL (hearing loss)    • Peripheral neuropathy    • Slow to wake up after anesthesia     AFTER HAVING PORT PLACEMENT AND REMOVAL, [facility]KE[facility]S , TOOK OVER 2 HOURS POST OP        Past Surgical History:   Procedure Laterality Date   • COLONOSCOPY N/A 12/10/2021    Procedure: COLONOSCOPY into cecum;  Surgeon: Tin Tom MD;  Location: [facility] ENDOSCOPY;  Service: Gastroenterology;  Laterality: N/A;  pre: screen  post: hemorrhoids    • D & C  WITH SUCTION     • EYE SURGERY     • HYSTERECTOMY     • LAPAROSCOPIC TUBAL LIGATION     • LASER ABLATION OF THE CERVIX     • VENOUS ACCESS DEVICE (PORT) INSERTION AND REMOVAL          Social History     Occupational History   • Not on file   Tobacco Use   • Smoking status: Former Smoker     Types: Cigarettes     Quit date: 2017     Years since quittin.0   • Smokeless tobacco: Never Used   • Tobacco comment: Occasional   Vaping Use   • Vaping Use: Never used   Substance and Sexual Activity   • Alcohol use: Yes     Comment: social   • Drug use: No   • Sexual activity: Yes     Partners: Male     Birth control/protection: Surgical      Social History     Social History Narrative   • Not on file        Family History   Problem Relation Age of Onset   • Diabetes Father    • Hypertension Father    • Breast cancer Paternal Aunt 45   • Breast cancer Paternal Aunt 45   • Malig Hyperthermia Neg Hx              Review of Systems: 14 point review of systems are remarkable for the pertinent positives listed in the chart by the patient the remainder negative    Review of Systems      Physical Exam: 46 y.o. female  General Appearance:    Alert, cooperative, in no acute distress                 There were no vitals filed for this visit.   Patient is alert and read ×3 no acute distress appears her above-listed at height weight and age.  Affect is normal respiratory rate is normal unlabored. Heart rate regular rate rhythm, sclera, dentition and hearing are normal for the purpose of this exam.        Ortho Exam Physical exam of the right knee reveals no effusion no redness.  The patient does have tenderness about the lateral joint line.  No tenderness about the medial joint line.  A negative bounce home and a positive lateral Jhoan.    Patient has a stable ligamentous exam.  The patient has a negative Lachman and negative anterior drawer and a negative pivot shift.  Quads are reasonable and symmetric bilaterally.  Calf is  soft and nontender.  There is no overlying skin changes no lymphedema lymphadenopathy.  Patient has good hip range of motion full symmetric and asymptomatic and a normal ankle exam      Procedures             Radiology:   AP, Lateral and merchant views of the right knee  were /reviewed to evauateknee pain.  These were taken by Salas Epperson I have reviewed them these are normal prep some very mild genu valgum but good maintenance of her joint space she also comes with an MRI which shows a radial tear of the lateral meniscus and mild patellofemoral OA I have reviewed and agree  Imaging Results (Most Recent)     None        Assessment/Plan:    Right knee lateral meniscus tear right now she is really doing pretty well she is able to do most things that she wants to do is not having a lot of symptoms.  We had a discussion regarding the natural history of meniscus tears the blood supply is she understands this she is going to really watch her symptoms watch her activity and if she is limited or symptomatic she will call me we will proceed with arthroscopy.  I did discuss in detail the perioperative regimen as well as risk benefits and alternatives The patient voiced understanding of the risks, benefits, and alternative forms of treatment that were discussed and the patient consents to proceed with the above listed surgery.  All risks, benefits and alternatives were discussed.  Risks including to but not exclusive to anesthetic complications, including death, MI, CVA, infection, bleeding DVT, fracture, residual pain and need for future surgery.

## 2022-02-17 ENCOUNTER — OFFICE VISIT (OUTPATIENT)
Dept: ORTHOPEDIC SURGERY | Facility: CLINIC | Age: 47
End: 2022-02-17

## 2022-02-17 VITALS — WEIGHT: 218.4 LBS | HEIGHT: 72 IN | BODY MASS INDEX: 29.58 KG/M2 | TEMPERATURE: 97.5 F

## 2022-02-17 DIAGNOSIS — S83.281A OTHER TEAR OF LATERAL MENISCUS OF RIGHT KNEE AS CURRENT INJURY, INITIAL ENCOUNTER: Primary | ICD-10-CM

## 2022-02-17 PROCEDURE — 73562 X-RAY EXAM OF KNEE 3: CPT | Performed by: ORTHOPAEDIC SURGERY

## 2022-02-17 PROCEDURE — 99214 OFFICE O/P EST MOD 30 MIN: CPT | Performed by: ORTHOPAEDIC SURGERY

## 2022-03-03 ENCOUNTER — HOSPITAL ENCOUNTER (OUTPATIENT)
Dept: PET IMAGING | Facility: HOSPITAL | Age: 47
Discharge: HOME | End: 2022-03-03
Admitting: OBSTETRICS & GYNECOLOGY

## 2022-03-03 DIAGNOSIS — C7A.8 NEUROENDOCRINE CARCINOMA OF CERVIX: ICD-10-CM

## 2022-03-03 LAB — CREAT BLDA-MCNC: 0.8 MG/DL (ref 0.6–1.3)

## 2022-03-03 PROCEDURE — 71260 CT THORAX DX C+: CPT

## 2022-03-03 PROCEDURE — 25010000002 IOPAMIDOL 61 % SOLUTION: Performed by: OBSTETRICS & GYNECOLOGY

## 2022-03-03 PROCEDURE — 0 DIATRIZOATE MEGLUMINE & SODIUM PER 1 ML: Performed by: OBSTETRICS & GYNECOLOGY

## 2022-03-03 PROCEDURE — 82565 ASSAY OF CREATININE: CPT

## 2022-03-03 PROCEDURE — 74177 CT ABD & PELVIS W/CONTRAST: CPT

## 2022-03-03 RX ADMIN — DIATRIZOATE MEGLUMINE AND DIATRIZOATE SODIUM 30 ML: 660; 100 LIQUID ORAL; RECTAL at 08:56

## 2022-03-03 RX ADMIN — IOPAMIDOL 85 ML: 612 INJECTION, SOLUTION INTRAVENOUS at 09:41

## 2022-03-07 NOTE — PROGRESS NOTES
Age: 46 y.o.  Sex: female  :  1975  MRN: [id number]       REFERRING PHYSICIAN: No ref. provider found  DATE OF VISIT: 3/7/2022      Ramona Bear presents to [facility] for ongoing surveillance of FIGO IB2, NEC cervix. She completed therapy in 2018 and has remained PATRICIA since that time. Recent CT scan on 3/3/22: PATRICIA, pulmonary nodule stable  She continues to have no complaints; denies bleeding, discharge, or pain.      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       • 18: Pelvic US: uterus- 9.19 x 5.55 x 5.26 cm; ET 0.96 cm; BL ovaries WNL. Uterine fibroid1.55 x 1.34 cm.   • 18: Cervical polyp BX: Small cell neuroendocrine carcinoma of cervix. Stains positive for synaptophysin, and CD56, confirming the neuroendocrine nature of the malignancy. IHC for p16 is negative, confirming absence of squamous differentiation. p16 is diffusely and strongly positive, suggesting an HPV driven process.   • 18: PET- a 1 cm rounded area of focal hot activity in the vaginal vault. There is a small amount of nonspecific mildly hypermetabolic activity throughout the region of the endometrial cavity.There is a small amount of symmetrical moderately hypermetabolic activity involving the vaginal labia without corresponding pathology identified on the CT images.   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 P16, synaptophysin, pancytokeratin AE1/ AE3 positive, with rare p63 positivity within the neuroendocrine component; Ki-67 strong at 100%.   • 18-  11/14/18: Cisplatin/ Etoposide x 4C (Cisplatin 75mg/m2 d1, Etoposide 100mg/m2 d1-3, q21d)  • 9/25/18: Caris results- MSI stable, Low Tumor mutation burden, Positive for MLH1, MSH2, MSH6, PMS2, PD-1, TS, RRM1, TOPO1, ER, MT, TOP2A.  • 10/14/20: CT CAP- PATRICIA  • 2/12/21: CT CAP- PATRICIA  • 7/14/21: CT CAP-PATRICIA  • 7/22/21: Pap smear-ASCUS, HPV (-)  • 9/16/21: Right side vaginal apex 9:00 bx-focal low-grade squamous intraepithelial lesion (VAIN I) A few minute benign glandular fragments are also noted.  • 12/01/21: CT CAP: Sub-6 mm pleural-based pulmonary nodule along the minor fissure, favored be benign. PATRICIA abdomen/pelvis.    12/13/21: 3 month follow up        Mammogram-negative (2018)  CSP-scheduled for 12/2021           MEDICATIONS:    Current Outpatient Medications:   •  Acetaminophen (TYLENOL EXTRA STRENGTH PO), Take  by mouth., Disp: , Rfl:     ALLERGIES:  No Known Allergies      ROS:  CONSTITUTIONAL:  Denies fever or chills.   NEUROLOGIC:  Denies headache, focal weakness or sensory changes.   EYES:  Denies change in visual acuity.  HEENT:  Denies nasal congestion or sore throat.   RESPIRATORY:  Denies cough or shortness of breath.   CARDIOVASCULAR:  Denies chest pain or edema.   GI:  Denies abdominal pain, nausea, vomiting, bloody stools or diarrhea.   :  Denies dysuria, leaking or incontinence.  MUSCULOSKELETAL:  Denies back pain or joint pain.   INTEGUMENT:  Denies rash.   ENDOCRINE:  Denies polyuria or polydipsia.   LYMPHATIC:  Denies swollen glands or lymphedema.   PSYCHIATRIC:  Denies depression or anxiety.      PHYSICAL EXAM:  There were no vitals filed for this visit.  There is no height or weight on file to calculate BMI.  Current Status 12/13/2021   ECOG score 0     PHQ-9 Total Score:         GEN: alert and oriented x 3, normal affect, well nourished and hydrated.  CARDIO: regular rate and rhythm.  PULM: Lungs CTA b.l, no RRW   ABD: Soft, nontender, nondistended.   GYN: External genitalia normal, no lesions,  speculum exam without lesions, cuff visualized- no new lesions/no mark tumor .  Bimanual exam does not reveal any palpable lesions.   EXT: No petechiae, bruising, rash, candida. No CCE.       Result Review :  The following data was reviewed by: Daniela Scott PA-C on 08/09/2021:  LABS:   WBC   Date Value Ref Range Status   01/10/2020 3.50 (L) 4.5 - 11.0 10*3/uL Final     RBC   Date Value Ref Range Status   01/10/2020 4.46 4.0 - 5.2 10*6/uL Final     Hemoglobin   Date Value Ref Range Status   01/10/2020 12.3 12.0 - 16.0 g/dL Final     Hematocrit   Date Value Ref Range Status   01/10/2020 37.4 36.0 - 46.0 % Final     Platelets   Date Value Ref Range Status   01/10/2020 222 140 - 440 10*3/uL Final     No results found for:     CT 3/3/22  IMPRESSION:  No evidence for metastatic disease to the chest, abdomen or  pelvis. 4 mm pulmonary nodule along the minor fissure is without change.     This report was finalized on 3/3/2022 2:04 PM by Dr. Vito Lim M.D.           ASSESSMENT :  • H/o FIGO IB2 NEC cervix  - PATRICIA since 11/2018  - 3/3/22: CT- PATRICIA, pulmonary nodule stable      PLAN :  • CT C/A/P reviewed with pt- continues with PATRICIA   • Send pap for post XRT, post hyst, NEC patient   • Instructed to call if she develops symptoms prior to next visit  • Follow up for continued surveillance in three months            Daniela Scott PA-C  3/7/2022    Gynecologic Oncology   [address] [place] [address]  [phone] office

## 2022-03-09 ENCOUNTER — OFFICE VISIT (OUTPATIENT)
Dept: GYNECOLOGIC ONCOLOGY | Facility: CLINIC | Age: 47
End: 2022-03-09

## 2022-03-09 VITALS
DIASTOLIC BLOOD PRESSURE: 81 MMHG | TEMPERATURE: 97.8 F | HEART RATE: 80 BPM | SYSTOLIC BLOOD PRESSURE: 116 MMHG | OXYGEN SATURATION: 98 % | RESPIRATION RATE: 16 BRPM | BODY MASS INDEX: 29.78 KG/M2 | WEIGHT: 219.9 LBS | HEIGHT: 72 IN

## 2022-03-09 DIAGNOSIS — C7A.8 NEUROENDOCRINE CARCINOMA OF CERVIX: Primary | ICD-10-CM

## 2022-03-09 PROCEDURE — 99213 OFFICE O/P EST LOW 20 MIN: CPT | Performed by: PHYSICIAN ASSISTANT

## 2022-03-15 ENCOUNTER — TELEPHONE (OUTPATIENT)
Dept: GYNECOLOGIC ONCOLOGY | Facility: CLINIC | Age: 47
End: 2022-03-15

## 2022-03-15 NOTE — TELEPHONE ENCOUNTER
Caller: Marianela Ramona    Relationship: Self    Best call back number: [phone]  CAN CALL ANYTIME AND MAY LEAVE VM IF NEEDED.    Who are you requesting to speak with (clinical staff, provider,  specific staff member): CLINICAL STAFF    What was the call regarding: PATIENT HAD RECENTLY HAD A PAP SMEAR DONE ON 03/09/2022 AND WOULD LIKE SOMEONE FROM CLINICAL TO CALL HER AND REVIEW THE RESULTS.    Do you require a callback: YES ONCE A DETERMINATION IS MADE.        [facility]

## 2022-03-16 LAB
CYTOLOGIST CVX/VAG CYTO: NORMAL
CYTOLOGY CVX/VAG DOC CYTO: NORMAL
DX ICD CODE: NORMAL
HIV 1 & 2 AB SER-IMP: NORMAL
Lab: NORMAL
OTHER STN SPEC: NORMAL
STAT OF ADQ CVX/VAG CYTO-IMP: NORMAL

## 2022-05-26 ENCOUNTER — TELEPHONE (OUTPATIENT)
Dept: GYNECOLOGIC ONCOLOGY | Facility: CLINIC | Age: 47
End: 2022-05-26

## 2022-05-26 ENCOUNTER — TELEPHONE (OUTPATIENT)
Dept: OBSTETRICS AND GYNECOLOGY | Facility: CLINIC | Age: 47
End: 2022-05-26

## 2022-05-26 NOTE — TELEPHONE ENCOUNTER
I called and spoke to patient. She was able to get an appointment with Dr. David tomorrow. Advised she should have Dr. David assess and let us know if she needs appointment with us. Patient in agreement.

## 2022-05-26 NOTE — TELEPHONE ENCOUNTER
Pt called in and stated that she needed to come in sooner than her Stacey appt. Pt states she has something growing out/around her clitoris. Got pt scheduled for 5/27

## 2022-05-26 NOTE — TELEPHONE ENCOUNTER
Marie Friend pt (also sees Dr David) called the [facility], which stated that she has small cell cervical cancer. Today she noticed she has a discharge, burning in her urethra & has a growth on it. Please advise if this is something  our office would treat or something for Dr. David.    Thank you,  Raquel

## 2022-05-27 ENCOUNTER — OFFICE VISIT (OUTPATIENT)
Dept: GYNECOLOGIC ONCOLOGY | Facility: CLINIC | Age: 47
End: 2022-05-27

## 2022-05-27 VITALS
TEMPERATURE: 98.1 F | WEIGHT: 214.4 LBS | SYSTOLIC BLOOD PRESSURE: 121 MMHG | HEART RATE: 80 BPM | BODY MASS INDEX: 29.04 KG/M2 | OXYGEN SATURATION: 96 % | HEIGHT: 72 IN | RESPIRATION RATE: 18 BRPM | DIASTOLIC BLOOD PRESSURE: 81 MMHG

## 2022-05-27 DIAGNOSIS — C7A.8 NEUROENDOCRINE CARCINOMA OF CERVIX: Primary | ICD-10-CM

## 2022-05-27 PROCEDURE — 99213 OFFICE O/P EST LOW 20 MIN: CPT | Performed by: OBSTETRICS & GYNECOLOGY

## 2022-05-27 NOTE — PROGRESS NOTES
Age: 46 y.o.  Sex: female  :  1975  MRN: [id number]       REFERRING PHYSICIAN: No ref. provider found  DATE OF VISIT: 2022      Ramona Bear presents to [facility] for ongoing surveillance of FIGO IB2, NEC cervix. She completed therapy in 2018 and has remained PATRICIA since that time. Recent CT scan on 3/3/22: PATRICIA, pulmonary nodule stable. Today is concerned about something near urethra. She has no other complaints; denies bleeding, discharge, or pain.      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       • 18: Pelvic US: uterus- 9.19 x 5.55 x 5.26 cm; ET 0.96 cm; BL ovaries WNL. Uterine fibroid1.55 x 1.34 cm.   • 18: Cervical polyp BX: Small cell neuroendocrine carcinoma of cervix. Stains positive for synaptophysin, and CD56, confirming the neuroendocrine nature of the malignancy. IHC for p16 is negative, confirming absence of squamous differentiation. p16 is diffusely and strongly positive, suggesting an HPV driven process.   • 18: PET- a 1 cm rounded area of focal hot activity in the vaginal vault. There is a small amount of nonspecific mildly hypermetabolic activity throughout the region of the endometrial cavity.There is a small amount of symmetrical moderately hypermetabolic activity involving the vaginal labia without corresponding pathology identified on the CT images.   • 18: Robotic assisted total laparoscopic hysterectomy, Bilateral salpingo-oophorectomy, Bilateral Pelvic lymphadenectomy: FIGO IB2 - mixed high grade NEC small cell (60%) and endocervical adenocarcinoma (40%) Tumor size 4.3cm, LVSI negative, DOI 7.5mm, horizontal spread 43mm, width 6mm, paracervical tissue negative for disease, all margins uninvolved by 12mm, all nodes negative 014. P16, synaptophysin, pancytokeratin AE1/ AE3 positive, with rare p63 positivity within the neuroendocrine  component; Ki-67 strong at 100%.   • 9/4/18- 11/14/18: Cisplatin/ Etoposide x 4C (Cisplatin 75mg/m2 d1, Etoposide 100mg/m2 d1-3, q21d)  • 9/25/18: Caris results- MSI stable, Low Tumor mutation burden, Positive for MLH1, MSH2, MSH6, PMS2, PD-1, TS, RRM1, TOPO1, ER, AL, TOP2A.  • 10/14/20: CT CAP- PATRICIA  • 2/12/21: CT CAP- PATRICIA  • 7/14/21: CT CAP-PATRICIA  • 7/22/21: Pap smear-ASCUS, HPV (-)  • 9/16/21: Right side vaginal apex 9:00 bx-focal low-grade squamous intraepithelial lesion (VAIN I) A few minute benign glandular fragments are also noted.  • 12/01/21: CT CAP: Sub-6 mm pleural-based pulmonary nodule along the minor fissure, favored be benign. PATRICIA abdomen/pelvis.  • 09/16/21: Right vaginal apex bx-9:00 VAIN 1  • 09/20/21: Right vaginal cuff bx-ulcer w/ granulation tissue, benign  • 03/03/22: CT CAP-PATRICIA, 4 mm pulmonary nodule without change.        03/09/22: 3 month follow up        Mammogram-negative (2018)  CSP-scheduled for 12/2021           MEDICATIONS:    Current Outpatient Medications:   •  Acetaminophen (TYLENOL EXTRA STRENGTH PO), Take  by mouth., Disp: , Rfl:     ALLERGIES:  No Known Allergies      ROS:  CONSTITUTIONAL:  Denies fever or chills.   NEUROLOGIC:  Denies headache, focal weakness or sensory changes.   EYES:  Denies change in visual acuity.  HEENT:  Denies nasal congestion or sore throat.   RESPIRATORY:  Denies cough or shortness of breath.   CARDIOVASCULAR:  Denies chest pain or edema.   GI:  Denies abdominal pain, nausea, vomiting, bloody stools or diarrhea.   :  Denies dysuria, leaking or incontinence.  MUSCULOSKELETAL:  Denies back pain or joint pain.   INTEGUMENT:  Denies rash.   ENDOCRINE:  Denies polyuria or polydipsia.   LYMPHATIC:  Denies swollen glands or lymphedema.   PSYCHIATRIC:  Denies depression or anxiety.      PHYSICAL EXAM:  Vitals:    05/27/22 0855   BP: 121/81   Pulse: 80   Resp: 18   Temp: 98.1 °F (36.7 °C)   TempSrc: Oral   SpO2: 96%   Weight: 97.3 kg (214 lb 6.4 oz)   Height:  "190.5 cm (75\")   PainSc: 0-No pain     Body mass index is 26.8 kg/m².  Current Status 5/27/2022   ECOG score 0     PHQ-9 Total Score:         GEN: alert and oriented x 3, normal affect, well nourished and hydrated.  CARDIO: regular rate and rhythm.  PULM: Lungs CTA b.l, no RRW   ABD: Soft, nontender, nondistended.   GYN: External genitalia normal, no lesions, speculum exam without lesions, cuff visualized- no new lesions/no mark tumor .  Bimanual exam does not reveal any palpable lesions.   EXT: No petechiae, bruising, rash, candida. No CCE.       Result Review :  The following data was reviewed by: Catrachita David DO on 08/09/2021:  LABS:   WBC   Date Value Ref Range Status   01/10/2020 3.50 (L) 4.5 - 11.0 10*3/uL Final     RBC   Date Value Ref Range Status   01/10/2020 4.46 4.0 - 5.2 10*6/uL Final     Hemoglobin   Date Value Ref Range Status   01/10/2020 12.3 12.0 - 16.0 g/dL Final     Hematocrit   Date Value Ref Range Status   01/10/2020 37.4 36.0 - 46.0 % Final     Platelets   Date Value Ref Range Status   01/10/2020 222 140 - 440 10*3/uL Final     No results found for:     CT 3/3/22  IMPRESSION:  No evidence for metastatic disease to the chest, abdomen or  pelvis. 4 mm pulmonary nodule along the minor fissure is without change.     This report was finalized on 3/3/2022 2:04 PM by Dr. Vito Lim M.D.           ASSESSMENT :  • H/o FIGO IB2 NEC cervix  - PATRICIA since 11/2018  - 3/3/22: CT- PATRICIA, pulmonary nodule stable  - Normal exam today   • Urethral orifice hypertrophy      PLAN :  • Continue surveillance q 3 mos until 8/2022 then q 6  mos until 11/2023. She will likely prefer annual exams therdafter.   • Instructed to call if she develops symptoms prior to next visit            Catrachita David D.O  5/27/2022    Gynecologic Oncology   [address] [place] [address]  [phone] office                        "

## 2022-08-19 ENCOUNTER — OFFICE VISIT (OUTPATIENT)
Dept: GYNECOLOGIC ONCOLOGY | Facility: CLINIC | Age: 47
End: 2022-08-19

## 2022-08-19 VITALS
WEIGHT: 212 LBS | SYSTOLIC BLOOD PRESSURE: 110 MMHG | OXYGEN SATURATION: 97 % | RESPIRATION RATE: 20 BRPM | HEART RATE: 96 BPM | HEIGHT: 72 IN | BODY MASS INDEX: 28.71 KG/M2 | DIASTOLIC BLOOD PRESSURE: 76 MMHG

## 2022-08-19 DIAGNOSIS — C7A.8 NEUROENDOCRINE CARCINOMA OF CERVIX: Primary | ICD-10-CM

## 2022-08-19 PROCEDURE — 99213 OFFICE O/P EST LOW 20 MIN: CPT | Performed by: PHYSICIAN ASSISTANT

## 2022-08-19 NOTE — PROGRESS NOTES
Age: 47 y.o.  Sex: female  :  1975  MRN: [id number]       REFERRING PHYSICIAN: No ref. provider found  DATE OF VISIT: 2022      Ramona Bear presents to [facility] for ongoing surveillance of FIGO IB2, NEC cervix. She completed therapy in 2018 and has remained PATRICIA since that time. Most recent CT scan on 3/3/22: PATRICIA, pulmonary nodule stable. No new complaints today.  Denies abnormal pelvic pain, vaginal discharge, or vaginal bleeding.  States she has anxiety secondary to losing her job and taking in two special needs grandchildren.      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       • 18: Pelvic US: uterus- 9.19 x 5.55 x 5.26 cm; ET 0.96 cm; BL ovaries WNL. Uterine fibroid1.55 x 1.34 cm.   • 18: Cervical polyp BX: Small cell neuroendocrine carcinoma of cervix. Stains positive for synaptophysin, and CD56, confirming the neuroendocrine nature of the malignancy. IHC for p16 is negative, confirming absence of squamous differentiation. p16 is diffusely and strongly positive, suggesting an HPV driven process.   • 18: PET- a 1 cm rounded area of focal hot activity in the vaginal vault. There is a small amount of nonspecific mildly hypermetabolic activity throughout the region of the endometrial cavity.There is a small amount of symmetrical moderately hypermetabolic activity involving the vaginal labia without corresponding pathology identified on the CT images.   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 P16, synaptophysin,  "pancytokeratin AE1/ AE3 positive, with rare p63 positivity within the neuroendocrine component; Ki-67 strong at 100%.   • 9/4/18- 11/14/18: Cisplatin/ Etoposide x 4C (Cisplatin 75mg/m2 d1, Etoposide 100mg/m2 d1-3, q21d)  • 9/25/18: Caris results- MSI stable, Low Tumor mutation burden, Positive for MLH1, MSH2, MSH6, PMS2, PD-1, TS, RRM1, TOPO1, ER, DE, TOP2A.  • 10/14/20: CT CAP- PATRICIA  • 2/12/21: CT CAP- PATRICIA  • 7/14/21: CT CAP-PATRICIA  • 7/22/21: Pap smear-ASCUS, HPV (-)  • 9/16/21: Right side vaginal apex 9:00 bx-focal low-grade squamous intraepithelial lesion (VAIN I) A few minute benign glandular fragments are also noted.  • 12/01/21: CT CAP: Sub-6 mm pleural-based pulmonary nodule along the minor fissure, favored be benign. PATRICIA abdomen/pelvis.  • 09/16/21: Right vaginal apex bx-9:00 VAIN 1  • 09/20/21: Right vaginal cuff bx-ulcer w/ granulation tissue, benign  • 03/03/22: CT CAP-PATRICIA, 4 mm pulmonary nodule without change.        8/19/22: 3 Month survelliance visit        Mammogram-negative (2018)  CSP-scheduled for 12/2021           MEDICATIONS:    Current Outpatient Medications:   •  predniSONE (DELTASONE) 20 MG tablet, Take 1 tablet by mouth 2 (Two) Times a Day for 5 days., Disp: 10 tablet, Rfl: 0  •  amoxicillin-clavulanate (AUGMENTIN) 875-125 MG per tablet, Take 1 tablet by mouth 2 (Two) Times a Day for 10 days., Disp: 20 tablet, Rfl: 0    ALLERGIES:  No Known Allergies      ROS:  As above, otherwise negative      PHYSICAL EXAM:  Vitals:    08/19/22 1052   BP: 110/76   Pulse: 96   Resp: 20   SpO2: 97%   Weight: 96.2 kg (212 lb)   Height: 190.5 cm (75\")   PainSc: 0-No pain     Body mass index is 26.5 kg/m².  Current Status 8/19/2022   ECOG score 0     PHQ-9 Total Score:         GEN: alert and oriented x 3, normal affect, no acute distress  ABD: Soft, nontender, nondistended  GYN: urethral orifice hypertrophy noted otherwise no exterior genital abnormalities; speculum exam without lesions, cuff visualized- no new " lesions/no mark tumor; PAP smear obtained; bimanual exam does not reveal any palpable lesions      Result Review :  The following data was reviewed by: Daniela Scott PA-C on 08/09/2021:  LABS:   WBC   Date Value Ref Range Status   01/10/2020 3.50 (L) 4.5 - 11.0 10*3/uL Final     RBC   Date Value Ref Range Status   01/10/2020 4.46 4.0 - 5.2 10*6/uL Final     Hemoglobin   Date Value Ref Range Status   01/10/2020 12.3 12.0 - 16.0 g/dL Final     Hematocrit   Date Value Ref Range Status   01/10/2020 37.4 36.0 - 46.0 % Final     Platelets   Date Value Ref Range Status   01/10/2020 222 140 - 440 10*3/uL Final     No results found for:     CT 3/3/22  IMPRESSION:  No evidence for metastatic disease to the chest, abdomen or  pelvis. 4 mm pulmonary nodule along the minor fissure is without change.     This report was finalized on 3/3/2022 2:04 PM by Dr. Vito Lim M.D.           ASSESSMENT :  • H/o FIGO IB2 NEC cervix  - PATRICIA since 11/2018  - 3/3/22: CT- PATRICIA, pulmonary nodule stable  - No acute abnormalities on exam today  • Urethral orifice hypertrophy      PLAN :  • PAP smear obtained, will also obtain CT scan and call with results  • Surveillance visits every 6 months until 11/2023, pending CT results  • Instructed to call if she develops symptoms prior to next visit            Gynecologic Oncology   [address] [place] [address]  [phone] office

## 2022-08-23 LAB
CYTOLOGIST CVX/VAG CYTO: NORMAL
CYTOLOGY CVX/VAG DOC CYTO: NORMAL
DX ICD CODE: NORMAL
HIV 1 & 2 AB SER-IMP: NORMAL
OTHER STN SPEC: NORMAL
STAT OF ADQ CVX/VAG CYTO-IMP: NORMAL

## 2022-09-07 ENCOUNTER — HOSPITAL ENCOUNTER (OUTPATIENT)
Dept: CT IMAGING | Facility: HOSPITAL | Age: 47
Discharge: HOME | End: 2022-09-07
Admitting: PHYSICIAN ASSISTANT

## 2022-09-07 DIAGNOSIS — C7A.8 NEUROENDOCRINE CARCINOMA OF CERVIX: ICD-10-CM

## 2022-09-07 PROCEDURE — 74177 CT ABD & PELVIS W/CONTRAST: CPT

## 2022-09-07 PROCEDURE — 25010000002 IOPAMIDOL 61 % SOLUTION: Performed by: PHYSICIAN ASSISTANT

## 2022-09-07 PROCEDURE — 71260 CT THORAX DX C+: CPT

## 2022-09-07 RX ADMIN — IOPAMIDOL 90 ML: 612 INJECTION, SOLUTION INTRAVENOUS at 08:03

## 2022-09-09 ENCOUNTER — TELEPHONE (OUTPATIENT)
Dept: GYNECOLOGIC ONCOLOGY | Age: 47
End: 2022-09-09

## 2022-09-09 NOTE — TELEPHONE ENCOUNTER
Caller: Ramona Bear    Relationship: Self        What was the call regarding: USUALLY GETS CT SCAN RESULTS DAY TO DAY AND HALF AFTER WORDS DUE TO THEY TYPE OF CANCER SHE HAS.     WANTED TO KNOW IF ABLE TO GO OVER THOSE RESULTS TODAY FROM CT SCAN DONE ON 9/07, HAD ASKED ABOUT IT YESTERDAY AND THE WERE WAITING ON RADIOLOGIST TO READ SCAN     WARM TRANSFERRED TO JOHN QUINTERO TO FURTHER ASSIST.

## 2022-09-12 ENCOUNTER — OFFICE VISIT (OUTPATIENT)
Dept: GYNECOLOGIC ONCOLOGY | Facility: CLINIC | Age: 47
End: 2022-09-12

## 2022-09-12 DIAGNOSIS — C7A.8 NEUROENDOCRINE CARCINOMA OF CERVIX: Primary | ICD-10-CM

## 2022-09-12 NOTE — PROGRESS NOTES
CT results called to pt.   Has follow up for surveillance in Feb 2023        Catrachita David D.O  9/12/2022    Gynecologic Oncology   [address] [facility] [address] [place] [address]  [phone] office

## 2022-11-16 ENCOUNTER — OFFICE VISIT (OUTPATIENT)
Dept: OBSTETRICS AND GYNECOLOGY | Facility: CLINIC | Age: 47
End: 2022-11-16

## 2022-11-16 VITALS
BODY MASS INDEX: 29.12 KG/M2 | SYSTOLIC BLOOD PRESSURE: 118 MMHG | WEIGHT: 215 LBS | DIASTOLIC BLOOD PRESSURE: 67 MMHG | HEIGHT: 72 IN

## 2022-11-16 DIAGNOSIS — N90.89 VULVAR LESION: Primary | ICD-10-CM

## 2022-11-16 PROCEDURE — 99212 OFFICE O/P EST SF 10 MIN: CPT | Performed by: OBSTETRICS & GYNECOLOGY

## 2022-11-16 NOTE — PROGRESS NOTES
HPI   Ramona Bear  is a 47 y.o. female who presents for evaluation of a vulvar lesion.  She reports that she noticed what appeared to be a boil on the vulva approximately 2 weeks ago.  She has not checked the area since then.  The lesion was mildly tender.  She has not experienced any fever or chills.  No nausea or vomiting.  No unexplained weight loss.  No itching or sweats.  Her history significant for cervical cancer which has required a radical hysterectomy, followed by radiation in the past.  Pap smears have been negative.    Chief Complaint   Patient presents with   • Follow-up     Patient is here for a f/u for a vaginal bump.       Past Medical History:   Diagnosis Date   • Anxiety    • BRCA negative    • Cancer (HCC)     SMALL CELL NEUROENDOCRINE CARCINOMA   • Environmental allergies    • Headache, tension-type    • History of chemotherapy 2019   • History of radiation therapy 2019   • HL (hearing loss)    • Peripheral neuropathy    • Slow to wake up after anesthesia     AFTER HAVING PORT PLACEMENT AND REMOVAL, [facility]'S , TOOK OVER 2 HOURS POST OP       Past Surgical History:   Procedure Laterality Date   • COLONOSCOPY N/A 12/10/2021    Procedure: COLONOSCOPY into cecum;  Surgeon: Tin Tom MD;  Location: [facility] ENDOSCOPY;  Service: Gastroenterology;  Laterality: N/A;  pre: screen  post: hemorrhoids    • D & C WITH SUCTION     • EYE SURGERY     • HYSTERECTOMY     • LAPAROSCOPIC TUBAL LIGATION     • LASER ABLATION OF THE CERVIX     • VENOUS ACCESS DEVICE (PORT) INSERTION AND REMOVAL         Social History     Socioeconomic History   • Marital status:    Tobacco Use   • Smoking status: Former     Types: Cigarettes     Quit date: 2017     Years since quittin.7   • Smokeless tobacco: Never   • Tobacco comments:     Occasional   Vaping Use   • Vaping Use: Never used   Substance and Sexual Activity   • Alcohol use: Not Currently   • Drug use: No   • Sexual activity: Yes      Partners: Male     Birth control/protection: Surgical       The following portions of the patient's history were reviewed and updated as appropriate: allergies, current medications, past family history, past medical history, past social history, past surgical history and problem list.    Review of Systems      This is negative for fever or chills.  Negative for nausea or vomiting.  Negative for unexplained weight change.  Negative for itching or sweats.    Physical Exam  Vitals and nursing note reviewed.   Constitutional:       Appearance: Normal appearance.   Abdominal:      General: There is no distension.      Palpations: Abdomen is soft.      Tenderness: There is no abdominal tenderness.   Genitourinary:     Comments: Normal female external genitalia.  The vulva was examined under good lighting.  There are no remaining vulvar lesions present.  The perineal body is also normal  The vagina is estrogenized.  There is a urethral carbuncle.  There is a small cystocele.  The cervix and uterus are surgically absent.  No pelvic masses are palpable  Neurological:      Mental Status: She is alert and oriented to person, place, and time.         Assessment    Diagnoses and all orders for this visit:    1. Vulvar lesion (Primary)        Plan  1. Vulvar lesion, now resolved.  By history, this appears to be an infectious lesion such as hidradenitis or folliculitis.  On physical examination today, the lesion has completely resolved.  I counseled the patient and answered her questions.  I advised her to return if the lesion should recur or if any other problems should occur as well.  The patient agrees with these recommendations.    2. No follow-ups on file.    Social History     Tobacco Use   Smoking Status Former   • Types: Cigarettes   • Quit date: 2017   • Years since quittin.7   Smokeless Tobacco Never   Tobacco Comments    Occasional   3.

## 2023-02-08 ENCOUNTER — OFFICE VISIT (OUTPATIENT)
Dept: GYNECOLOGIC ONCOLOGY | Facility: CLINIC | Age: 48
End: 2023-02-08
Payer: COMMERCIAL

## 2023-02-08 VITALS
HEART RATE: 82 BPM | OXYGEN SATURATION: 97 % | BODY MASS INDEX: 27.3 KG/M2 | WEIGHT: 218.4 LBS | RESPIRATION RATE: 15 BRPM | DIASTOLIC BLOOD PRESSURE: 73 MMHG | SYSTOLIC BLOOD PRESSURE: 120 MMHG | TEMPERATURE: 97.5 F

## 2023-02-08 DIAGNOSIS — C7A.8 NEUROENDOCRINE CARCINOMA OF CERVIX: Primary | ICD-10-CM

## 2023-02-08 PROCEDURE — 99213 OFFICE O/P EST LOW 20 MIN: CPT | Performed by: PHYSICIAN ASSISTANT

## 2023-02-08 NOTE — PROGRESS NOTES
Age: 47 y.o.  Sex: female  :  1975  MRN: [id number]       REFERRING PHYSICIAN: No ref. provider found  DATE OF VISIT: 2023      Ramona Bear presents to [facility] for ongoing surveillance of FIGO IB2, NEC cervix. She completed therapy in 2018 and has remained PATRICIA since that time. Most recent PAP smear on 22 negative for carcinoma, CT scan on 22: PATRICIA.  Continuing to do well today- no new complaints.  Denies abnormal pelvic pain, vaginal discharge, or vaginal bleeding.       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       • 18: Pelvic US: uterus- 9.19 x 5.55 x 5.26 cm; ET 0.96 cm; BL ovaries WNL. Uterine fibroid1.55 x 1.34 cm.   • 18: Cervical polyp BX: Small cell neuroendocrine carcinoma of cervix. Stains positive for synaptophysin, and CD56, confirming the neuroendocrine nature of the malignancy. IHC for p16 is negative, confirming absence of squamous differentiation. p16 is diffusely and strongly positive, suggesting an HPV driven process.   • 18: PET- a 1 cm rounded area of focal hot activity in the vaginal vault. There is a small amount of nonspecific mildly hypermetabolic activity throughout the region of the endometrial cavity.There is a small amount of symmetrical moderately hypermetabolic activity involving the vaginal labia without corresponding pathology identified on the CT images.   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 P16, synaptophysin, pancytokeratin AE1/ AE3 positive, with rare p63 positivity  within the neuroendocrine component; Ki-67 strong at 100%.   • 9/4/18- 11/14/18: Cisplatin/ Etoposide x 4C (Cisplatin 75mg/m2 d1, Etoposide 100mg/m2 d1-3, q21d)  • 9/25/18: Caris results- MSI stable, Low Tumor mutation burden, Positive for MLH1, MSH2, MSH6, PMS2, PD-1, TS, RRM1, TOPO1, ER, OK, TOP2A.  • 10/14/20: CT CAP- PATRICIA  • 2/12/21: CT CAP- PATRICIA  • 7/14/21: CT CAP-PATRICIA  • 7/22/21: Pap smear-ASCUS, HPV (-)  • 9/16/21: Right side vaginal apex 9:00 bx-focal low-grade squamous intraepithelial lesion (VAIN I) A few minute benign glandular fragments are also noted.  • 12/01/21: CT CAP: Sub-6 mm pleural-based pulmonary nodule along the minor fissure, favored be benign. PATRICIA abdomen/pelvis.  • 9/16/21: Right vaginal apex bx-9:00 VAIN 1  • 9/20/21: Right vaginal cuff bx-ulcer w/ granulation tissue, benign  • 3/03/22: CT CAP - PATRICIA, 4 mm pulmonary nodule without change.  • 9/7/22: CT CAP - No convincing evidence of recurrence or metastatic disease within the chest, abdomen and pelvis.      2/8/23: 6 Mo Follow Up        Mammogram-negative (2018)  CSP-scheduled for 12/2021           MEDICATIONS:  No current outpatient medications on file.    ALLERGIES:  No Known Allergies      ROS:  As above, otherwise negative      PHYSICAL EXAM:  There were no vitals filed for this visit.  There is no height or weight on file to calculate BMI.  Current Status 8/19/2022   ECOG score 0     PHQ-9 Total Score:         GEN: alert and oriented x 3, normal affect, no acute distress  GYN: urethral orifice hypertrophy noted otherwise no exterior genital abnormalities; speculum exam without lesions, cuff visualized- no new lesions/no mark tumor; PAP smear obtained; bimanual exam does not reveal any palpable lesions    No change to exam today. Daniela Scott PA-C        Result Review :  The following data was reviewed by: Daniela Scott PA-C on 08/09/2021:  LABS:   WBC   Date Value Ref Range Status   01/10/2020 3.50 (L) 4.5 - 11.0 10*3/uL Final     RBC    Date Value Ref Range Status   01/10/2020 4.46 4.0 - 5.2 10*6/uL Final     Hemoglobin   Date Value Ref Range Status   01/10/2020 12.3 12.0 - 16.0 g/dL Final     Hematocrit   Date Value Ref Range Status   01/10/2020 37.4 36.0 - 46.0 % Final     Platelets   Date Value Ref Range Status   01/10/2020 222 140 - 440 10*3/uL Final     No results found for:           ASSESSMENT :  • H/o FIGO IB2 NEC cervix  - PATRICIA since 11/2018  - 3/3/22: CT- PATRICIA, pulmonary nodule stable  - 9/7/22: PATRICIA  - No acute abnormalities on exam today  • Urethral orifice hypertrophy      PLAN :  • PAP smear obtained, will call with results  • Surveillance visits every 6 months until 11/2023, CT prior to visit  • Instructed to call if she develops symptoms prior to next visit  • All questions and concerns answered            Gynecologic Oncology   [address] [place] [address]  [phone] office

## 2023-02-09 ENCOUNTER — TELEPHONE (OUTPATIENT)
Dept: GYNECOLOGIC ONCOLOGY | Facility: CLINIC | Age: 48
End: 2023-02-09
Payer: COMMERCIAL

## 2023-02-09 NOTE — TELEPHONE ENCOUNTER
Caller: MarianelaCiarra    Relationship: Self    Best call back number: [phone]    What is the best time to reach you: ANY    Who are you requesting to speak with (clinical staff, provider,  specific staff member): CLINICAL    What was the call regarding: CIARRA IS CALLING STATED THAT [facility] SCHEDULING CALLED HER TO MAKE AN APPT FOR HER CT. SHE STATED THAT THEY WERE GOING TO DO THE CHEST CT NOW AND WAIT UNTIL AUG TO DO THE ABD/PELVIS    CIARRA WAS MAKING SURE THIS HOW JAYDEN GUPTA WANTED IT TO BE SCHEDULED     Do you require a callback: YES

## 2023-04-18 ENCOUNTER — OFFICE VISIT (OUTPATIENT)
Dept: OBSTETRICS AND GYNECOLOGY | Facility: CLINIC | Age: 48
End: 2023-04-18
Payer: COMMERCIAL

## 2023-04-18 ENCOUNTER — PROCEDURE VISIT (OUTPATIENT)
Dept: OBSTETRICS AND GYNECOLOGY | Facility: CLINIC | Age: 48
End: 2023-04-18
Payer: COMMERCIAL

## 2023-04-18 VITALS
SYSTOLIC BLOOD PRESSURE: 118 MMHG | DIASTOLIC BLOOD PRESSURE: 67 MMHG | BODY MASS INDEX: 30.23 KG/M2 | WEIGHT: 223.2 LBS | HEIGHT: 72 IN

## 2023-04-18 DIAGNOSIS — Z12.11 COLON CANCER SCREENING: ICD-10-CM

## 2023-04-18 DIAGNOSIS — N63.10 MASS OF RIGHT BREAST, UNSPECIFIED QUADRANT: ICD-10-CM

## 2023-04-18 DIAGNOSIS — Z12.31 VISIT FOR SCREENING MAMMOGRAM: Primary | ICD-10-CM

## 2023-04-18 DIAGNOSIS — Z00.00 ANNUAL PHYSICAL EXAM: Primary | ICD-10-CM

## 2023-04-18 RX ORDER — CETIRIZINE HYDROCHLORIDE 10 MG/1
10 TABLET ORAL DAILY
COMMUNITY

## 2023-04-18 NOTE — PROGRESS NOTES
HPI   Ramona Bear  is a 47 y.o. female who presents for 2 reasons.  First, she would like to have a routine gynecologic exam.  Overall, she is feeling well.  Bowels and bladder are functioning normally.  Next, the patient is a 5-year cervical cancer survivor.  She is seeing Dr. Ohara for this.  There has been no evidence of disease up to this point.  Next, the patient reports that she has possibly felt a lump underneath the right breast.  She only intermittently is able to feel it.  She knows that she has muscle spasms in the chest wall, but feels that this could represent a different finding.    Chief Complaint   Patient presents with   • Gynecologic Exam     Patient is here for a annual.       Past Medical History:   Diagnosis Date   • Anxiety    • BRCA negative    • Cancer     SMALL CELL NEUROENDOCRINE CARCINOMA   • Environmental allergies    • Headache, tension-type    • History of chemotherapy 2019   • History of radiation therapy 2019   • HL (hearing loss)    • Peripheral neuropathy    • Slow to wake up after anesthesia     AFTER HAVING PORT PLACEMENT AND REMOVAL, [facility]'S , TOOK OVER 2 HOURS POST OP       Past Surgical History:   Procedure Laterality Date   • COLONOSCOPY N/A 12/10/2021    Procedure: COLONOSCOPY into cecum;  Surgeon: Tin Tom MD;  Location: [facility] ENDOSCOPY;  Service: Gastroenterology;  Laterality: N/A;  pre: screen  post: hemorrhoids    • D & C WITH SUCTION     • EYE SURGERY     • HYSTERECTOMY     • LAPAROSCOPIC TUBAL LIGATION     • LASER ABLATION OF THE CERVIX     • VENOUS ACCESS DEVICE (PORT) INSERTION AND REMOVAL         Social History     Socioeconomic History   • Marital status:    Tobacco Use   • Smoking status: Former     Types: Cigarettes     Quit date: 2017     Years since quittin.1   • Smokeless tobacco: Never   • Tobacco comments:     Occasional   Vaping Use   • Vaping Use: Never used   Substance and Sexual Activity   • Alcohol use: Not  Currently   • Drug use: No   • Sexual activity: Yes     Partners: Male     Birth control/protection: Surgical       The following portions of the patient's history were reviewed and updated as appropriate: allergies, current medications, past family history, past medical history, past social history, past surgical history and problem list.    Review of Systems      Patient reports that she is not currently experiencing any symptoms of urinary incontinence.  This is positive for possible breast lump.  It is negative for fever or chills.  Negative for nausea or vomiting.  Negative for unexplained weight change.  All other systems are reviewed and are negative.    Physical Exam  Vitals and nursing note reviewed.   Constitutional:       Appearance: She is well-developed.   HENT:      Head: Normocephalic and atraumatic.   Cardiovascular:      Rate and Rhythm: Normal rate and regular rhythm.   Pulmonary:      Effort: Pulmonary effort is normal.      Breath sounds: Normal breath sounds. No wheezing or rales.   Chest:      Comments: The breasts are dense bilaterally.  There are no palpable breast lumps.  Nipple discharge and axillary adenopathy are absent bilaterally.  Abdominal:      General: There is no distension.      Palpations: Abdomen is soft.      Tenderness: There is no abdominal tenderness.   Genitourinary:     Labia:         Right: No lesion.         Left: No lesion.       Vagina: Normal. No vaginal discharge.      Comments: The vaginal cuff is well approximated.  There are no vaginal or pelvic lesions palpable.  There are no pelvic masses palpable.  The rectovaginal septum is smooth and normal on physical examination.  No rectal masses are palpable  Skin:     General: Skin is warm and dry.   Neurological:      Mental Status: She is alert and oriented to person, place, and time.         Assessment    Diagnoses and all orders for this visit:    1. Annual physical exam (Primary)    2. Mass of right breast,  unspecified quadrant  -     Mammo Diagnostic Bilateral With CAD; Future    3. Colon cancer screening  -     Ambulatory Referral to Gastroenterology        Plan  1. Annual examination performed  2. Cervical cancer survivor.  Physical examination shows no evidence of recurrent disease.  Pap of the vaginal cuff was performed.  The patient has a follow-up with Dr. Ohara scheduled for August.  3. Counseled regarding recommendations to begin colon cancer screening at age 47.  The patient would like to begin this.  A referral has been sent.  4. Possible right breast lump.  The patient reports that she occasionally palpates a lump in the inferior portion of the right breast.  I am not able to reproduce this finding today, nor is the patient.  I counseled her and answered her questions.  I recommend diagnostic breast imaging.  The patient agrees with this recommendation.  Orders have been placed.    5. No follow-ups on file.    Social History     Tobacco Use   Smoking Status Former   • Types: Cigarettes   • Quit date: 2017   • Years since quittin.1   Smokeless Tobacco Never   Tobacco Comments    Occasional   6.

## 2023-04-20 ENCOUNTER — TELEPHONE (OUTPATIENT)
Dept: OBSTETRICS AND GYNECOLOGY | Facility: CLINIC | Age: 48
End: 2023-04-20
Payer: COMMERCIAL

## 2023-04-20 NOTE — TELEPHONE ENCOUNTER
Called pt to review results of screening mammogram done on 4/18/23 that was negative.  The lump, discussed with Dr Salazar at her appt that followed her mammo, she only feels occasionally, is under the right breast.  So only a DX Right mammo would be needed with and US.  Since the screening was negative she was going to wait a month then decided if she wanted to continue with the DX.  She will definitely call sooner if she experiences any symptoms.

## 2023-04-24 LAB
CONV .: NORMAL
CYTOLOGIST CVX/VAG CYTO: NORMAL
CYTOLOGY CVX/VAG DOC CYTO: NORMAL
CYTOLOGY CVX/VAG DOC THIN PREP: NORMAL
DX ICD CODE: NORMAL
HIV 1 & 2 AB SER-IMP: NORMAL
OTHER STN SPEC: NORMAL
STAT OF ADQ CVX/VAG CYTO-IMP: NORMAL

## 2023-08-07 ENCOUNTER — HOSPITAL ENCOUNTER (OUTPATIENT)
Dept: CT IMAGING | Facility: HOSPITAL | Age: 48
Discharge: HOME | End: 2023-08-07
Admitting: OBSTETRICS & GYNECOLOGY
Payer: COMMERCIAL

## 2023-08-07 DIAGNOSIS — C7A.8 NEUROENDOCRINE CARCINOMA OF CERVIX: ICD-10-CM

## 2023-08-07 PROCEDURE — 71260 CT THORAX DX C+: CPT

## 2023-08-07 PROCEDURE — 74177 CT ABD & PELVIS W/CONTRAST: CPT

## 2023-08-07 PROCEDURE — 25510000001 IOPAMIDOL 61 % SOLUTION: Performed by: OBSTETRICS & GYNECOLOGY

## 2023-08-07 RX ADMIN — IOPAMIDOL 85 ML: 612 INJECTION, SOLUTION INTRAVENOUS at 09:36

## 2023-08-10 ENCOUNTER — TELEPHONE (OUTPATIENT)
Dept: GYNECOLOGIC ONCOLOGY | Facility: CLINIC | Age: 48
End: 2023-08-10
Payer: COMMERCIAL

## 2023-08-10 NOTE — TELEPHONE ENCOUNTER
Reached out to patient  see if she want to follow up with her regular OB/GYN or to see Dr David and she opted to see Dr David. Patient was scheudled on 09/13 to follow up with Dr David. Verbalized understanding and instucted to be sure and reach out for any other questions or concerns

## 2023-09-13 ENCOUNTER — OFFICE VISIT (OUTPATIENT)
Dept: GYNECOLOGIC ONCOLOGY | Facility: CLINIC | Age: 48
End: 2023-09-13
Payer: COMMERCIAL

## 2023-09-13 VITALS
HEIGHT: 72 IN | SYSTOLIC BLOOD PRESSURE: 118 MMHG | BODY MASS INDEX: 29.8 KG/M2 | HEART RATE: 77 BPM | WEIGHT: 220 LBS | TEMPERATURE: 98.7 F | OXYGEN SATURATION: 95 % | RESPIRATION RATE: 16 BRPM | DIASTOLIC BLOOD PRESSURE: 85 MMHG

## 2023-09-13 DIAGNOSIS — C7A.8 NEUROENDOCRINE CARCINOMA OF CERVIX: Primary | ICD-10-CM

## 2023-09-13 DIAGNOSIS — C7A.8 NEUROENDOCRINE CARCINOMA OF CERVIX: ICD-10-CM

## 2023-09-13 NOTE — PROGRESS NOTES
Age: 48 y.o.  Sex: female  :  1975  MRN: [id number]       REFERRING PHYSICIAN: No ref. provider found  DATE OF VISIT: 2023      Ramona Bear presents to [facility] for ongoing surveillance of FIGO IB2, NEC cervix. She completed therapy in 2018 and has remained PATRICIA since that time. Most recent PAP smear on 23 negative for carcinoma, CT scan on 23: PATRICIA.  Continuing to do well today- no new complaints.  Denies abnormal pelvic pain, vaginal discharge, or vaginal bleeding. States ex- passed away suddenly three weeks ago.  Her focus has been on their three kids.  States she and her kids see a therapist.  No needs at this time.      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       18: Pelvic US: uterus- 9.19 x 5.55 x 5.26 cm; ET 0.96 cm; BL ovaries WNL. Uterine fibroid1.55 x 1.34 cm.   18: Cervical polyp BX: Small cell neuroendocrine carcinoma of cervix. Stains positive for synaptophysin, and CD56, confirming the neuroendocrine nature of the malignancy. IHC for p16 is negative, confirming absence of squamous differentiation. p16 is diffusely and strongly positive, suggesting an HPV driven process.   18: PET- a 1 cm rounded area of focal hot activity in the vaginal vault. There is a small amount of nonspecific mildly hypermetabolic activity throughout the region of the endometrial cavity.There is a small amount of symmetrical moderately hypermetabolic activity involving the vaginal labia without corresponding pathology identified on the CT images.   18: Robotic assisted total laparoscopic hysterectomy, Bilateral salpingo-oophorectomy, Bilateral Pelvic lymphadenectomy: FIGO IB2 - mixed high grade NEC small cell (60%) and endocervical adenocarcinoma (40%) Tumor size 4.3cm, LVSI negative, DOI 7.5mm, horizontal spread 43mm, width 6mm, paracervical tissue  negative for disease, all margins uninvolved by 12mm, all nodes negative 0/14. P16, synaptophysin, pancytokeratin AE1/ AE3 positive, with rare p63 positivity within the neuroendocrine component; Ki-67 strong at 100%.   9/4/18- 11/14/18: Cisplatin/ Etoposide x 4C (Cisplatin 75mg/m2 d1, Etoposide 100mg/m2 d1-3, q21d)  9/25/18: Caris results- MSI stable, Low Tumor mutation burden, Positive for MLH1, MSH2, MSH6, PMS2, PD-1, TS, RRM1, TOPO1, ER, MS, TOP2A.  10/14/20: CT CAP- PATRICIA  2/12/21: CT CAP- PATRICIA  7/14/21: CT CAP-PATRICIA  7/22/21: Pap smear-ASCUS, HPV (-)  9/16/21: Right side vaginal apex 9:00 bx-focal low-grade squamous intraepithelial lesion (VAIN I) A few minute benign glandular fragments are also noted.  12/01/21: CT CAP: Sub-6 mm pleural-based pulmonary nodule along the minor fissure, favored be benign. PATRICIA abdomen/pelvis.  9/16/21: Right vaginal apex bx-9:00 VAIN 1  9/20/21: Right vaginal cuff bx-ulcer w/ granulation tissue, benign  3/03/22: CT CAP - PATRICIA, 4 mm pulmonary nodule without change.  9/7/22: CT CAP - No convincing evidence of recurrence or metastatic disease within the chest, abdomen and pelvis.  8/7/23: CT CAP -   No convincing findings to suggest metastatic disease. Recommend continued oncologic surveillance as clinically indicated.      9/13/23: 6 Mo Follow Up / review CT Scan        Mammogram-negative (2018)  CSP-scheduled for 12/2021           MEDICATIONS:    Current Outpatient Medications:     cetirizine (zyrTEC) 10 MG tablet, Take 1 tablet by mouth Daily., Disp: , Rfl:     ALLERGIES:  Allergies   Allergen Reactions    Escitalopram Unknown - Low Severity         ROS:  As above, otherwise negative      PHYSICAL EXAM:  There were no vitals filed for this visit.  There is no height or weight on file to calculate BMI.      9/13/2023     8:52 AM   Current Status   ECOG score 0     PHQ-9 Total Score:         GEN: alert and oriented x 3, normal affect, no acute distress  GYN: urethral orifice hypertrophy  noted otherwise no exterior genital abnormalities; speculum exam without lesions, cuff visualized- no new lesions/no mark tumor; PAP smear obtained; bimanual exam does not reveal any palpable lesions    No change to exam today. Daniela Scott PA-C        Result Review :  The following data was reviewed by: Daniela Scott PA-C on 08/09/2021:  LABS:   WBC   Date Value Ref Range Status   01/10/2020 3.50 (L) 4.5 - 11.0 10*3/uL Final     RBC   Date Value Ref Range Status   01/10/2020 4.46 4.0 - 5.2 10*6/uL Final     Hemoglobin   Date Value Ref Range Status   01/10/2020 12.3 12.0 - 16.0 g/dL Final     Hematocrit   Date Value Ref Range Status   01/10/2020 37.4 36.0 - 46.0 % Final     Platelets   Date Value Ref Range Status   01/10/2020 222 140 - 440 10*3/uL Final     No results found for:           ASSESSMENT :  H/o FIGO IB2 NEC cervix  PATRICIA since 11/2018  3/3/22: CT CAP- PATRICIA, pulmonary nodule stable  9/7/22: CT CAP- PATRICIA  8/7/23: CT CAP- PATRICIA  No acute abnormalities on exam today  Urethral orifice hypertrophy      PLAN :  CT results reviewed with pt  PAP smear obtained, will call with results  Informed pt that Dr. David is no longer with [facility]; explained to pt that she has been PATRICIA for five years, okay to be referred back gynecologist for further care, if gynecologist feels she needs to continue with gyn onc, they will be able to refer her to [facility] or [facility] at that time  All questions and concerns answered              Gynecologic Oncology   [address] [place] [address] [address] [place] [address]  [phone] office

## 2023-09-18 ENCOUNTER — TELEPHONE (OUTPATIENT)
Dept: GYNECOLOGIC ONCOLOGY | Facility: CLINIC | Age: 48
End: 2023-09-18
Payer: COMMERCIAL

## 2023-09-18 LAB
CYTOLOGIST CVX/VAG CYTO: NORMAL
CYTOLOGY CVX/VAG DOC CYTO: NORMAL
DX ICD CODE: NORMAL
HIV 1 & 2 AB SER-IMP: NORMAL
Lab: NORMAL
OTHER STN SPEC: NORMAL
RECOM F/U CVX/VAG CYTO: NORMAL
STAT OF ADQ CVX/VAG CYTO-IMP: NORMAL

## 2023-09-18 NOTE — TELEPHONE ENCOUNTER
Patient informed that her PAP smear came back normal. Instructed patient to call for any questions or concerns.

## 2023-12-11 ENCOUNTER — TELEPHONE (OUTPATIENT)
Dept: OBSTETRICS AND GYNECOLOGY | Facility: CLINIC | Age: 48
End: 2023-12-11

## 2023-12-11 NOTE — TELEPHONE ENCOUNTER
Provider: DR MACIAS     Caller: CIARRA CARVALHO    Relationship to Patient: SELF        Reason for Call: PT NOTICED SOME BLEEDING WHEN WIPING LAST NIGHT - PT IS CONCERNED DUE TO THAT BEING THE WAY SHE FOUND OUT SHE HAD CERVICAL CANCER LAST TIME - PT HAS HAD A HYSTERECTOMY - PT DID HAVE AN INCIDENT AT HER HOUSE LAST NIGHT WHERE HER NEPHEW HAD A BLOODY NOSE AND SHE IS NOT SURE IF THAT IS WHERE THE BLOOD CAME FROM ON THE TOILET PAPER OR NOT BUT PT IS CONCERNED AND WOULD LIKE TO GET CHECKED OUT PLEASE CALL PT TO ADVISE     When was the patient last seen: 4/18/03

## 2023-12-14 ENCOUNTER — OFFICE VISIT (OUTPATIENT)
Dept: OBSTETRICS AND GYNECOLOGY | Facility: CLINIC | Age: 48
End: 2023-12-14
Payer: COMMERCIAL

## 2023-12-14 VITALS — WEIGHT: 216.8 LBS | BODY MASS INDEX: 27.1 KG/M2 | DIASTOLIC BLOOD PRESSURE: 77 MMHG | SYSTOLIC BLOOD PRESSURE: 108 MMHG

## 2023-12-14 DIAGNOSIS — Z85.41 HISTORY OF CERVICAL CANCER: ICD-10-CM

## 2023-12-14 DIAGNOSIS — N93.9 VAGINAL SPOTTING: Primary | ICD-10-CM

## 2023-12-14 NOTE — PROGRESS NOTES
HPI   Ramona Bear  is a 48 y.o. female who presents for evaluation of spotting.  The patient noticed blood on the toilet paper on Saturday.  She did not have any pain associated with this.  She does not recall any traumatic events in the several days leading up to this.  She had no further episodes.  Denies unexplained weight loss.  She is losing weight intentionally.  Denies itching or sweats.  Does have occasional loose stools, but this is not new.  The patient's history is significant for radical hysterectomy followed by chemotherapy and radiation.  This was performed by Dr. Ohara in 2019 for cervical cancer.  All testing since then has been negative.    Chief Complaint   Patient presents with    Follow-up     Tissue with blood in toilet.        Past Medical History:   Diagnosis Date    Anxiety     BRCA negative     Cancer     SMALL CELL NEUROENDOCRINE CARCINOMA    Environmental allergies     Headache, tension-type     History of chemotherapy 2019    History of radiation therapy 2019    HL (hearing loss)     Peripheral neuropathy     Slow to wake up after anesthesia     AFTER HAVING PORT PLACEMENT AND REMOVAL, [facility]S , TOOK OVER 2 HOURS POST OP       Past Surgical History:   Procedure Laterality Date    COLONOSCOPY N/A 12/10/2021    Procedure: COLONOSCOPY into cecum;  Surgeon: Tin Tom MD;  Location: [facility] ENDOSCOPY;  Service: Gastroenterology;  Laterality: N/A;  pre: screen  post: hemorrhoids     D & C WITH SUCTION      EYE SURGERY      HYSTERECTOMY      LAPAROSCOPIC TUBAL LIGATION      LASER ABLATION OF THE CERVIX      VENOUS ACCESS DEVICE (PORT) INSERTION AND REMOVAL         Social History     Socioeconomic History    Marital status:    Tobacco Use    Smoking status: Former     Types: Cigarettes     Quit date: 2017     Years since quittin.8     Passive exposure: Never    Smokeless tobacco: Never    Tobacco comments:     Occasional   Vaping Use    Vaping Use: Never used    Substance and Sexual Activity    Alcohol use: Not Currently    Drug use: No    Sexual activity: Yes     Partners: Male     Birth control/protection: Surgical       The following portions of the patient's history were reviewed and updated as appropriate: allergies, current medications, past family history, past medical history, past social history, past surgical history and problem list.    Review of Systems     This is negative for fever or chills.  Negative for itching or sweats.  Negative for unexplained weight change.  Positive for an episode of vaginal spotting.    Physical Exam  Vitals and nursing note reviewed.   Constitutional:       Appearance: Normal appearance.   Abdominal:      General: There is no distension.      Palpations: Abdomen is soft. There is no mass.      Tenderness: There is no abdominal tenderness.   Genitourinary:     Comments: Normal female external genitalia  There are no enlarged groin lymph nodes  The vagina is atrophic.  There are no visible vaginal lesions.  There are no palpable vaginal lesions.  Pap smear of the superior vagina was performed with a spatula.  There are no pelvic masses palpable on bimanual examination  The rectum was examined.  The anal sphincter has normal tone.  No rectal masses are palpable.  The rectovaginal septum is normal.  Neurological:      Mental Status: She is alert and oriented to person, place, and time.         Assessment    Diagnoses and all orders for this visit:    1. Vaginal spotting (Primary)    2. History of cervical cancer        Plan  A single episode of vaginal spotting in a patient who is 4 years out from treatment of cervical cancer.  Physical examination is negative for evidence of disease.  A Pap smear was performed.  Further recommendations pending the results of this study.    No follow-ups on file.    Social History     Tobacco Use   Smoking Status Former    Types: Cigarettes    Quit date: 2017    Years since quittin.8     Passive exposure: Never   Smokeless Tobacco Never   Tobacco Comments    Occasional

## 2023-12-19 LAB
CONV .: NORMAL
CYTOLOGIST CVX/VAG CYTO: NORMAL
CYTOLOGY CVX/VAG DOC CYTO: NORMAL
CYTOLOGY CVX/VAG DOC THIN PREP: NORMAL
DX ICD CODE: NORMAL
HIV 1 & 2 AB SER-IMP: NORMAL
Lab: NORMAL
OTHER STN SPEC: NORMAL
STAT OF ADQ CVX/VAG CYTO-IMP: NORMAL

## 2023-12-21 ENCOUNTER — TELEPHONE (OUTPATIENT)
Dept: OBSTETRICS AND GYNECOLOGY | Facility: CLINIC | Age: 48
End: 2023-12-21
Payer: COMMERCIAL

## 2024-01-03 ENCOUNTER — TELEPHONE (OUTPATIENT)
Dept: GASTROENTEROLOGY | Facility: CLINIC | Age: 49
End: 2024-01-03
Payer: COMMERCIAL

## 2024-01-03 NOTE — TELEPHONE ENCOUNTER
LAST C/S 12/10/21 IN [facility]    NO PERSONAL HX OF POLYPS    FAMILY HX OF POLYPS    NO FAMILY HX OF COLON CA    NO ASA OR BLOOD THINNERS        LIST OF  MEDICATIONS      ZYRTEC  SOURSOP TEA        OA QUESTIONNAIRE SCANNED IN MEDIA

## 2024-01-05 ENCOUNTER — PREP FOR SURGERY (OUTPATIENT)
Dept: OTHER | Facility: HOSPITAL | Age: 49
End: 2024-01-05
Payer: COMMERCIAL

## 2024-01-05 ENCOUNTER — TELEPHONE (OUTPATIENT)
Dept: GASTROENTEROLOGY | Facility: CLINIC | Age: 49
End: 2024-01-05
Payer: COMMERCIAL

## 2024-01-05 DIAGNOSIS — Z80.0 FAMILY HISTORY OF GI MALIGNANCY: Primary | ICD-10-CM

## 2024-01-05 NOTE — TELEPHONE ENCOUNTER
PT IS GOING TO WAIT UNTIL THE SUMMER CALENDAR BECOMES OPEN SITTING TO DEFER AT THIS TIME   WILL PRINT OUT FACE SHEET AND WILL BE CALLING SOONER IF CALENDAR BECOMES OPEN

## 2024-01-31 ENCOUNTER — TELEPHONE (OUTPATIENT)
Dept: GASTROENTEROLOGY | Facility: CLINIC | Age: 49
End: 2024-01-31
Payer: COMMERCIAL

## 2024-02-01 ENCOUNTER — TELEPHONE (OUTPATIENT)
Dept: GASTROENTEROLOGY | Facility: CLINIC | Age: 49
End: 2024-02-01
Payer: COMMERCIAL

## 2024-02-01 NOTE — TELEPHONE ENCOUNTER
GOING TO CHECK TO SEE IF PT IS DUE FOR COLON PROCEDURE WITH PROVIDER RECALL DATE IS 11/2031    SENDING TO CLINICAL POOL AND PROVIDER TO SEE  IF SHE NEEDS TO FARHAD CASE REQUEST OR IS SHE OK TO HOLD OFF UNTIL RECALL DATE

## 2024-05-10 ENCOUNTER — HOSPITAL ENCOUNTER (EMERGENCY)
Facility: HOSPITAL | Age: 49
Discharge: HOME | End: 2024-05-10
Attending: EMERGENCY MEDICINE
Payer: COMMERCIAL

## 2024-05-10 VITALS
BODY MASS INDEX: 29.8 KG/M2 | HEART RATE: 77 BPM | SYSTOLIC BLOOD PRESSURE: 110 MMHG | OXYGEN SATURATION: 97 % | WEIGHT: 220.02 LBS | RESPIRATION RATE: 18 BRPM | HEIGHT: 72 IN | DIASTOLIC BLOOD PRESSURE: 91 MMHG | TEMPERATURE: 97 F

## 2024-05-10 DIAGNOSIS — K62.5 RECTAL BLEEDING: Primary | ICD-10-CM

## 2024-05-10 PROCEDURE — 99283 EMERGENCY DEPT VISIT LOW MDM: CPT

## 2024-05-10 NOTE — DISCHARGE INSTRUCTIONS
Stay well-hydrated, would recommend using a stool softener for the near future to help limit any straining, can begin using over-the-counter hemorrhoid creams as needed for discomfort although I suspect this is likely an internal bleeding hemorrhoid and not likely as amenable to topical treatment.  Outpatient PCP follow-up for recheck, ED return for worsening symptoms as needed.

## 2024-05-10 NOTE — ED NOTES
Pt comes in from home with bright red blood when she wiped her rectum this morning. Pt denies any blood coming from her vagina. Pt denies any rectal pain. Pt does have a known hemorrhoid. Pt has history of cervical cancer and wanted to be sure the bleeding was not related to her cancer history. Pt is A&Ox4. Pt reports mild nausea and attributes the nausea to not eating this morning. Pt's mother is at bedside

## 2024-05-10 NOTE — ED PROVIDER NOTES
EMERGENCY DEPARTMENT ENCOUNTER    Room Number:  15/15  PCP: Suman Lemons APRN  Historian: Patient      HPI:  Chief Complaint: Rectal bleeding  A complete HPI/ROS/PMH/PSH/SH/FH are unobtainable due to: None    Context: Ramona Bear is a 48 y.o. female who presents to the ED via private vehicle c/o acute prior blood per rectum while wiping this morning.  No significant rectal pain, no abdominal pain.  Has a remote history of cervical cancer status post hysterectomy and chemoradiation.  She was concerned that maybe this could be related.  Does have a history of hemorrhoids.  Reports normal regular bowel movements, occasional straining, has started working out more recently.      MEDICAL RECORD REVIEW    External (non-ED) record review: No anticoagulants noted on chart review in epic              PAST MEDICAL HISTORY  Active Ambulatory Problems     Diagnosis Date Noted    Neuroendocrine carcinoma of cervix 07/23/2018    Migraine without aura and without status migrainosus, not intractable 07/29/2019    Generalized anxiety disorder 08/08/2018    Encounter for screening for malignant neoplasm of colon 08/12/2021     Resolved Ambulatory Problems     Diagnosis Date Noted    No Resolved Ambulatory Problems     Past Medical History:   Diagnosis Date    Anxiety     BRCA negative     Cancer     Environmental allergies     Headache, tension-type     History of chemotherapy 02/2019    History of radiation therapy 02/2019    HL (hearing loss)     Peripheral neuropathy     Slow to wake up after anesthesia          PAST SURGICAL HISTORY  Past Surgical History:   Procedure Laterality Date    COLONOSCOPY N/A 12/10/2021    Procedure: COLONOSCOPY into cecum;  Surgeon: Tin Tom MD;  Location: [facility] ENDOSCOPY;  Service: Gastroenterology;  Laterality: N/A;  pre: screen  post: hemorrhoids     D & C WITH SUCTION      EYE SURGERY      HYSTERECTOMY      LAPAROSCOPIC TUBAL LIGATION      LASER ABLATION OF THE CERVIX       VENOUS ACCESS DEVICE (PORT) INSERTION AND REMOVAL           FAMILY HISTORY  Family History   Problem Relation Age of Onset    Diabetes Father     Hypertension Father     Breast cancer Paternal Aunt 45    Breast cancer Paternal Aunt 45    Malig Hyperthermia Neg Hx          SOCIAL HISTORY  Social History     Socioeconomic History    Marital status:    Tobacco Use    Smoking status: Former     Current packs/day: 0.00     Types: Cigarettes     Quit date: 2017     Years since quittin.2     Passive exposure: Never    Smokeless tobacco: Never    Tobacco comments:     Occasional   Vaping Use    Vaping status: Never Used   Substance and Sexual Activity    Alcohol use: Not Currently    Drug use: No    Sexual activity: Yes     Partners: Male     Birth control/protection: Surgical         ALLERGIES  Escitalopram        REVIEW OF SYSTEMS  Review of Systems     All systems reviewed and negative except for those discussed in HPI.       PHYSICAL EXAM    I have reviewed the triage vital signs and nursing notes.    ED Triage Vitals   Temp Heart Rate Resp BP SpO2   05/10/24 0815 05/10/24 0815 05/10/24 0815 05/10/24 0823 05/10/24 0815   97 °F (36.1 °C) 98 16 121/90 97 %      Temp src Heart Rate Source Patient Position BP Location FiO2 (%)   -- 05/10/24 0823 05/10/24 0823 05/10/24 0823 --    Monitor Lying Right arm        Physical Exam  General: No acute distress, nontoxic  HEENT: Mucous membranes moist, atraumatic, EOMI  Neck: Full ROM  Pulm: Symmetric chest rise, nonlabored, lungs CTAB  Cardiovascular: Regular rate and rhythm, intact distal pulses  GI: Soft, nontender, nondistended, no rebound, no guarding, bowel sounds present  Rectal: LEON Kim chaperone, patient with nonthrombosed external hemorrhoids, does have a trace amount of blood within the anal canal.  No blood noted at the external vagina or visible distal vaginal canal  MSK: Full ROM, no deformity  Skin: Warm, dry  Neuro: Awake, alert, oriented x  4, GCS 15, moving all extremities, no focal deficits  Psych: Calm, cooperative        LAB RESULTS  No results found for this or any previous visit (from the past 24 hour(s)).    Ordered the above labs and independently interpreted results. My findings will be discussed in the medical decision making section below        RADIOLOGY  No Radiology Exams Resulted Within Past 24 Hours    Ordered the above noted radiological studies.  Independently interpreted by me and my independent review of findings can be found in the ED Course.  See dictation for official radiology interpretation.      PROCEDURES    Procedures        MEDICATIONS GIVEN IN ER    Medications - No data to display      PROGRESS, DATA ANALYSIS, CONSULTS, AND MEDICAL DECISION MAKING    Please note that this section constitutes my independent interpretation of clinical data including lab results, radiology, EKG's.  This constitutes my independent professional opinion regarding differential diagnosis and management of this patient.  It may include any factors such as history from outside sources, review of external records, social determinants of health, management of medications, response to those treatments, and discussions with other providers.    Differential Diagnosis and Plan: Initial concern for bleeding internal hemorrhoid, external hemorrhoid, anal fissure, among others.  Lower concern at this point for vaginal bleeding.  Based on history and exam, no need for labs or imaging at this time.  Safe for discharge with outpatient supportive care and outpatient PCP follow-up.  ED return for worsening symptoms as needed.    Additional sources:  - Discussed/ obtained information from independent historians:       - (Social Determinants of Health): None     - Shared decision making:  Patient and mother at bedside fully updated on and in agreement with the course and plan moving forward         Hospitalization Considered?:     Orders Placed During This  Visit:  No orders of the defined types were placed in this encounter.      Additional orders considered but not placed:      Independent interpretation of labs, radiology studies, and discussions with consultants: See ED Course        AS OF 08:43 EDT VITALS:    BP - 110/91  HR - 77  TEMP - 97 °F (36.1 °C)  02 SATS - 97%          DIAGNOSIS  Final diagnoses:   Rectal bleeding         DISPOSITION  ED Disposition       ED Disposition   Discharge    Condition   Stable    Comment   --                Please note that portions of this document were completed with a voice recognition program.    Note Disclaimer: At [facility], we believe that sharing information builds trust and better relationships. You are receiving this note because you recently visited [facility]. It is possible you will see health information before a provider has talked with you about it. This kind of information can be easy to misunderstand. To help you fully understand what it means for your health, we urge you to discuss this note with your provider.                       Jose Luis Cabrera MD  05/10/24 1022

## 2024-05-14 ENCOUNTER — TELEPHONE (OUTPATIENT)
Dept: GASTROENTEROLOGY | Facility: CLINIC | Age: 49
End: 2024-05-14
Payer: COMMERCIAL

## 2024-05-14 NOTE — TELEPHONE ENCOUNTER
Maximino Rojo:    Good afternoon,  I am reaching out because I have a small amount of rectal bleeding and don't know if I should be concerned. It started Friday May 10th I woke up and went to the restroom and the tissue was full but not soaked through with blood. I went to the ER and they only did a physical rectal exam and said they saw blood but nothing of concern. I've had no physical strains that I can recall. There is still blood about the size of a dime or less Everytime I have a bowel movement. I've never had to deal with a hemorrhoid like this. I'm trying not to be concerned. Is this normal? Thank you.    Lets give her a call and let her know that it is certainly possible that hemorrhoids are causing her rectal bleeding. Lets have her try Preparation H suppositories as she did have hemorrhoids that were seen on her colon years ago. And will make an appointment and discuss things further at that time.

## 2024-05-14 NOTE — TELEPHONE ENCOUNTER
Patient called. She states she has been passing bright red blood with her BM's. She states the first time it happened she had quite a bit of blood on the tissue.     Advised as per Alia's note. She verb understanding and will purchase the prepH. Follow up with Alia scheduled for 5/28@1000a.

## 2024-05-28 ENCOUNTER — OFFICE VISIT (OUTPATIENT)
Dept: GASTROENTEROLOGY | Facility: CLINIC | Age: 49
End: 2024-05-28
Payer: COMMERCIAL

## 2024-05-28 VITALS
TEMPERATURE: 97.3 F | SYSTOLIC BLOOD PRESSURE: 106 MMHG | BODY MASS INDEX: 29.39 KG/M2 | DIASTOLIC BLOOD PRESSURE: 62 MMHG | WEIGHT: 217 LBS | HEART RATE: 85 BPM | OXYGEN SATURATION: 96 % | HEIGHT: 72 IN

## 2024-05-28 DIAGNOSIS — K62.5 RECTAL BLEEDING: Primary | ICD-10-CM

## 2024-05-28 DIAGNOSIS — K64.8 INTERNAL HEMORRHOIDS: ICD-10-CM

## 2024-05-28 RX ORDER — CETIRIZINE HYDROCHLORIDE 10 MG/1
10 TABLET ORAL DAILY
COMMUNITY
Start: 2024-04-25 | End: 2025-04-25

## 2024-05-28 RX ORDER — MULTIVIT-MIN/IRON/FOLIC ACID/K 18-600-40
CAPSULE ORAL 2 TIMES DAILY
COMMUNITY

## 2024-05-28 NOTE — PROGRESS NOTES
"Chief Complaint  Hemorrhoids and Rectal Bleeding    Subjective          History Of Present Illness:    Ramona Bear is a  48 y.o. female patient of Dr. Tom who presents as a follow-up for rectal bleeding.    Patient went to the ER on 5/10/2024 in the setting of rectal bleeding.  Patient had a change in rectal exam performed at that time with nonthrombosed external hemorrhoids, otherwise normal.    Patient reports the rectal bleeding has resolved since then.  She has been using over-the-counter Preparation H for hemorrhoids.  Patient reports she has a chronic history of a intermittently prolapsing internal hemorrhoid.  Patient denies constipation but does report frequent soft stools.  Patient denies any melena, abdominal pain, nausea, vomiting, unintentional weight loss, poor appetite.    Additional data reviewed:   Colonoscopy 2021 -nonbleeding internal hemorrhoids, otherwise normal.  Recall 10 years.  Objective   Vital Signs:   /62   Pulse 85   Temp 97.3 °F (36.3 °C) (Temporal)   Ht 192 cm (75.6\")   Wt 98.4 kg (217 lb)   SpO2 96%   BMI 26.69 kg/m²       Physical Exam  Vitals reviewed.   Constitutional:       General: She is not in acute distress.     Appearance: Normal appearance. She is not ill-appearing.   HENT:      Head: Normocephalic and atraumatic.      Nose: Nose normal.      Mouth/Throat:      Pharynx: Oropharynx is clear.   Eyes:      Extraocular Movements: Extraocular movements intact.      Conjunctiva/sclera: Conjunctivae normal.      Pupils: Pupils are equal, round, and reactive to light.   Pulmonary:      Effort: Pulmonary effort is normal.   Abdominal:      General: There is no distension.      Palpations: Abdomen is soft. There is no mass.      Tenderness: There is no abdominal tenderness.   Musculoskeletal:         General: No swelling. Normal range of motion.      Cervical back: Normal range of motion.   Skin:     General: Skin is warm and dry.      Findings: No bruising or rash. "   Neurological:      General: No focal deficit present.      Mental Status: She is alert and oriented to person, place, and time.      Motor: No weakness.      Gait: Gait normal.   Psychiatric:         Mood and Affect: Mood normal.          Result Review :   The following data was reviewed by: Alia Sher PA-C on 05/28/2024:            Assessment and Plan    Diagnoses and all orders for this visit:    1. Rectal bleeding (Primary)    2. Internal hemorrhoids       Encouraged high-fiber diet and recommend that she start a daily fiber supplement such as Metamucil.  Recommend starting with 1 teaspoon and increasing up to 2 to 3 teaspoons daily.  Recommend preparation H nightly for 2-3 weeks for ongoing hemorrhoidal issues. We did discuss rectal suppositories that contain hydrocortisone as an alternative.     Follow Up   Return if symptoms worsen or fail to improve, for Alia Longoria PA-C.    Dragon dictation used throughout this note.            Alia Longoria PA-C   [facility]  [address] [place] [address]  Office: [phone]

## 2024-06-06 ENCOUNTER — TELEPHONE (OUTPATIENT)
Dept: OBSTETRICS AND GYNECOLOGY | Facility: CLINIC | Age: 49
End: 2024-06-06
Payer: COMMERCIAL

## 2024-06-06 DIAGNOSIS — C7A.8 NEUROENDOCRINE CARCINOMA OF CERVIX: Primary | ICD-10-CM

## 2024-06-06 NOTE — TELEPHONE ENCOUNTER
"Advised pt I am unable to to expedite an appointment at the hospital (order was not entered as STAT/same day).  I did provide the phone number to [facility], so she may go ahead and call to schedule, instead of wait on them to call her.  Pt will request to be seen prior to 6/19.      Dr. Salazar,   Pt wanted me to ask you if it is possible to pull a breast muscle.  I asked her how long it has hurting, pt replied \"there is no pain, but it feels like there is a rolled up sock under my right breast\".  I asked if she was doing anything specific when it may have happened,.  Pt said the only thing she could think of is when she lifted weights about a month ago.  The discomfort has gotten worse and is hard to wear her underwire bra.  Last mammogram was 4/2023, normal result.     Please advise.     Thanks,   Milla    Pt # [phone]        "

## 2024-06-07 ENCOUNTER — TELEPHONE (OUTPATIENT)
Dept: OBSTETRICS AND GYNECOLOGY | Facility: CLINIC | Age: 49
End: 2024-06-07
Payer: COMMERCIAL

## 2024-06-07 NOTE — TELEPHONE ENCOUNTER
The CT scan that was ordered was of the abdomen and pelvis which is the CT scan the patient gets yearly due to her cervical cancer history.  This lesion under the breast would not be evaluated by such a scan.  I would be happy to see her next week in the office, or if this is causing her extreme distress, she could be evaluated in the emergency room immediately.  Thank you.

## 2024-06-07 NOTE — TELEPHONE ENCOUNTER
----- Message from Carito NIETO sent at 6/6/2024 12:44 PM EDT -----  Regarding: Insurance/new doctor  Contact: [phone]  Can you help get Ramona scheduled for a CT scan related to neuroendocrine cancer. She has an appt 6/19/24 with Dr Salazar and he is hoping it will be done prior to the appt. In the past she has gone to the lower level of the cancer center on [facility].   Thank you so much! Carito      ----- Message -----  From: Ramona Bear  Sent: 6/6/2024  12:42 PM EDT  To: [facility]  Subject: Insurance/new doctor                             It used to be at the lower level of the cancer center on [facility].

## 2024-06-08 ENCOUNTER — HOSPITAL ENCOUNTER (EMERGENCY)
Facility: HOSPITAL | Age: 49
Discharge: HOME | End: 2024-06-08
Attending: EMERGENCY MEDICINE
Payer: COMMERCIAL

## 2024-06-08 ENCOUNTER — APPOINTMENT (OUTPATIENT)
Dept: GENERAL RADIOLOGY | Facility: HOSPITAL | Age: 49
End: 2024-06-08
Payer: COMMERCIAL

## 2024-06-08 VITALS
BODY MASS INDEX: 29.39 KG/M2 | HEIGHT: 72 IN | HEART RATE: 70 BPM | TEMPERATURE: 98.2 F | RESPIRATION RATE: 16 BRPM | OXYGEN SATURATION: 94 % | SYSTOLIC BLOOD PRESSURE: 123 MMHG | WEIGHT: 217 LBS | DIASTOLIC BLOOD PRESSURE: 81 MMHG

## 2024-06-08 DIAGNOSIS — R07.9 NONSPECIFIC CHEST PAIN: Primary | ICD-10-CM

## 2024-06-08 LAB
ALBUMIN SERPL-MCNC: 4.4 G/DL (ref 3.5–5.2)
ALBUMIN/GLOB SERPL: 2.2 G/DL
ALP SERPL-CCNC: 108 U/L (ref 39–117)
ALT SERPL W P-5'-P-CCNC: 10 U/L (ref 1–33)
ANION GAP SERPL CALCULATED.3IONS-SCNC: 8 MMOL/L (ref 5–15)
AST SERPL-CCNC: 13 U/L (ref 1–32)
BASOPHILS # BLD MANUAL: 0.04 10*3/MM3 (ref 0–0.2)
BASOPHILS NFR BLD MANUAL: 1.1 % (ref 0–1.5)
BILIRUB SERPL-MCNC: 0.3 MG/DL (ref 0–1.2)
BUN SERPL-MCNC: 16 MG/DL (ref 6–20)
BUN/CREAT SERPL: 17.6 (ref 7–25)
CALCIUM SPEC-SCNC: 9.9 MG/DL (ref 8.6–10.5)
CHLORIDE SERPL-SCNC: 105 MMOL/L (ref 98–107)
CO2 SERPL-SCNC: 27 MMOL/L (ref 22–29)
CREAT SERPL-MCNC: 0.91 MG/DL (ref 0.57–1)
D DIMER PPP FEU-MCNC: 0.13 MCGFEU/ML (ref 0–0.5)
DEPRECATED RDW RBC AUTO: 37.7 FL (ref 37–54)
EGFRCR SERPLBLD CKD-EPI 2021: 79 ML/MIN/1.73
EOSINOPHIL # BLD MANUAL: 0.04 10*3/MM3 (ref 0–0.4)
EOSINOPHIL NFR BLD MANUAL: 1.1 % (ref 0.3–6.2)
ERYTHROCYTE [DISTWIDTH] IN BLOOD BY AUTOMATED COUNT: 12.2 % (ref 12.3–15.4)
GLOBULIN UR ELPH-MCNC: 2 GM/DL
GLUCOSE SERPL-MCNC: 104 MG/DL (ref 65–99)
HCT VFR BLD AUTO: 36.6 % (ref 34–46.6)
HGB BLD-MCNC: 12.2 G/DL (ref 12–15.9)
LIPASE SERPL-CCNC: 39 U/L (ref 13–60)
LYMPHOCYTES # BLD MANUAL: 1.1 10*3/MM3 (ref 0.7–3.1)
LYMPHOCYTES NFR BLD MANUAL: 2.2 % (ref 5–12)
MAGNESIUM SERPL-MCNC: 1.9 MG/DL (ref 1.6–2.6)
MCH RBC QN AUTO: 28 PG (ref 26.6–33)
MCHC RBC AUTO-ENTMCNC: 33.3 G/DL (ref 31.5–35.7)
MCV RBC AUTO: 84.1 FL (ref 79–97)
MONOCYTES # BLD: 0.08 10*3/MM3 (ref 0.1–0.9)
NEUTROPHILS # BLD AUTO: 2.54 10*3/MM3 (ref 1.7–7)
NEUTROPHILS NFR BLD MANUAL: 66.7 % (ref 42.7–76)
PLAT MORPH BLD: NORMAL
PLATELET # BLD AUTO: 211 10*3/MM3 (ref 140–450)
PMV BLD AUTO: 9.1 FL (ref 6–12)
POTASSIUM SERPL-SCNC: 3.9 MMOL/L (ref 3.5–5.2)
PROT SERPL-MCNC: 6.4 G/DL (ref 6–8.5)
QT INTERVAL: 419 MS
QTC INTERVAL: 443 MS
RBC # BLD AUTO: 4.35 10*6/MM3 (ref 3.77–5.28)
RBC MORPH BLD: NORMAL
SODIUM SERPL-SCNC: 140 MMOL/L (ref 136–145)
TROPONIN T SERPL HS-MCNC: 7 NG/L
VARIANT LYMPHS NFR BLD MANUAL: 29 % (ref 19.6–45.3)
WBC MORPH BLD: NORMAL
WBC NRBC COR # BLD AUTO: 3.84 10*3/MM3 (ref 3.4–10.8)

## 2024-06-08 PROCEDURE — 99283 EMERGENCY DEPT VISIT LOW MDM: CPT | Performed by: EMERGENCY MEDICINE

## 2024-06-08 PROCEDURE — 83690 ASSAY OF LIPASE: CPT | Performed by: EMERGENCY MEDICINE

## 2024-06-08 PROCEDURE — 80053 COMPREHEN METABOLIC PANEL: CPT | Performed by: EMERGENCY MEDICINE

## 2024-06-08 PROCEDURE — 99284 EMERGENCY DEPT VISIT MOD MDM: CPT

## 2024-06-08 PROCEDURE — 85007 BL SMEAR W/DIFF WBC COUNT: CPT | Performed by: EMERGENCY MEDICINE

## 2024-06-08 PROCEDURE — 71045 X-RAY EXAM CHEST 1 VIEW: CPT

## 2024-06-08 PROCEDURE — 93010 ELECTROCARDIOGRAM REPORT: CPT | Performed by: INTERNAL MEDICINE

## 2024-06-08 PROCEDURE — 93005 ELECTROCARDIOGRAM TRACING: CPT | Performed by: EMERGENCY MEDICINE

## 2024-06-08 PROCEDURE — 85379 FIBRIN DEGRADATION QUANT: CPT | Performed by: EMERGENCY MEDICINE

## 2024-06-08 PROCEDURE — 84484 ASSAY OF TROPONIN QUANT: CPT | Performed by: EMERGENCY MEDICINE

## 2024-06-08 PROCEDURE — 83735 ASSAY OF MAGNESIUM: CPT | Performed by: EMERGENCY MEDICINE

## 2024-06-08 PROCEDURE — 85025 COMPLETE CBC W/AUTO DIFF WBC: CPT | Performed by: EMERGENCY MEDICINE

## 2024-06-08 RX ORDER — HYDROXYZINE PAMOATE 25 MG/1
25 CAPSULE ORAL 3 TIMES DAILY PRN
Qty: 30 CAPSULE | Refills: 0 | Status: SHIPPED | OUTPATIENT
Start: 2024-06-08

## 2024-06-08 RX ORDER — FAMOTIDINE 20 MG/1
20 TABLET, FILM COATED ORAL 2 TIMES DAILY
Qty: 28 TABLET | Refills: 0 | Status: SHIPPED | OUTPATIENT
Start: 2024-06-08 | End: 2024-06-22

## 2024-06-08 NOTE — DISCHARGE INSTRUCTIONS
Today your exam is reassuring.  I do not see any evidence of a heart attack.  Likewise the test for blood clot to the lung is likewise negative.    I did send in 2 prescriptions.  1 is called Vistaril.  This is an antihistamine based antianxiety medication.  It is not addictive but is quite effective.  Take as directed    I did also send in a prescription for prescription strength Pepcid twice daily.  This may improve your symptoms if this does represent gastritis or esophagitis.    Lastly I did place an internal referral to our cardiology group.  They will notify you at the first of the week and help set up a follow-up appointment in the office.    I do think you are very safe.  We are happy to see you at any time should your symptoms worsen or you have other difficulties    Please read all of the instructions in this handout.  If you receive prescriptions please fill them and take them as directed.  Please call your primary care physician for follow-up appointment in the next 5 to 7 days.  If you do not have a physician you may call the [facility] referral line at [phone].    You may return to the emergency department at any time for any concerns such as worsening symptoms.  If you received a work or school note it will be printed at the back of this packet.

## 2024-06-08 NOTE — FSED PROVIDER NOTE
EMERGENCY DEPARTMENT ENCOUNTER    Room Number:  04/04  Date seen:  6/8/2024  Time seen: 10:22 EDT  PCP: Suman Lemons APRN  Historian:     Discussed/obtained information from independent historians:     HPI:    A complete HPI/ROS/PMH/PSH/SH/FH are unobtainable due to:   Context:Ramona Bear is a 48 y.o. female who presents to the ED with c/o chest pain.    The patient is a very nice 48-year-old female.  She presents with a 4 to 6-week history of daily anterior chest pain.  She states it last all day in a typical day.  It feels like a bubble inside of her chest.  She does admit to associated shortness of breath.  No nausea vomiting.  No trauma.  No personal history of coronary artery disease, no personal history of DVT or PE.  No calf pain or swelling.  No alleviating factors.  Exacerbating factors include tight fitting brassiere.    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    Migraine without aura and without status migrainosus, not intractable 07/29/2019    Generalized anxiety disorder 08/08/2018    Encounter for screening for malignant neoplasm of colon 08/12/2021     Resolved Ambulatory Problems     Diagnosis Date Noted    No Resolved Ambulatory Problems     Past Medical History:   Diagnosis Date    Anxiety     BRCA negative     Cancer     Environmental allergies     Headache, tension-type     History of chemotherapy 02/2019    History of radiation therapy 02/2019    HL (hearing loss)     Peripheral neuropathy     Slow to wake up after anesthesia        PAST SURGICAL HISTORY  Past Surgical History:   Procedure Laterality Date    COLONOSCOPY N/A 12/10/2021    Procedure: COLONOSCOPY into cecum;  Surgeon: Tin Tom MD;  Location: [facility] ENDOSCOPY;  Service: Gastroenterology;  Laterality: N/A;  pre:  screen  post: hemorrhoids     D & C WITH SUCTION      EYE SURGERY      HYSTERECTOMY      LAPAROSCOPIC TUBAL LIGATION      LASER ABLATION OF THE CERVIX      VENOUS ACCESS DEVICE (PORT) INSERTION AND REMOVAL         FAMILY HISTORY  Family History   Problem Relation Age of Onset    Colon polyps Father     Diabetes Father     Hypertension Father     Breast cancer Paternal Aunt 45    Breast cancer Paternal Aunt 45    Malig Hyperthermia Neg Hx        SOCIAL HISTORY  Social History     Socioeconomic History    Marital status:    Tobacco Use    Smoking status: Former     Current packs/day: 0.00     Types: Cigarettes     Quit date: 2017     Years since quittin.3     Passive exposure: Never    Smokeless tobacco: Never    Tobacco comments:     Occasional   Vaping Use    Vaping status: Never Used   Substance and Sexual Activity    Alcohol use: Not Currently    Drug use: No    Sexual activity: Yes     Partners: Male     Birth control/protection: Surgical       REVIEW OF SYSTEMS  Review of Systems    All systems reviewed and negative except for those discussed in HPI.     PHYSICAL EXAM    I have reviewed the triage vital signs and nursing notes.  Vitals:    24 1009   BP: 123/81   Pulse: 70   Resp: 16   Temp: 98.2 °F (36.8 °C)   SpO2: 94%     Physical Exam  Vital signs: Reviewed in nurses notes    General: Awake alert no acute distress.  Patient is anxious    HEENT: Pupils equal round responsive to light.  Extra-ocular movements are intact.  No scleral icterus.  Nasopharynx is clear.  Oropharynx is clear with moist mucous membranes.  No masses noted    Neck:   Supple without elevation of JVD    Respiratory:   Nonlabored respirations.  Clear to auscultation bilaterally.  Equal breath sounds bilaterally.  No wheezes or stridor noted.    Chest: Nontender to palpation    Cardiovascular: Regular rate and rhythm.  No murmur.  Equal pulses in bilateral lower extremities without edema.    Abdomen:  Nondistended    Skin:   Warm and dry.  No rashes noted    Neurological examination: Patient is awake alert oriented x4.  Speech is normal.  No facial palsy.  No focal motor or sensory deficits.    ECG 12 Lead      Date/Time: 6/8/2024 10:15 AM    Performed by: Antonio Kebede MD  Authorized by: Antonio Kebede MD  Interpreted by ED physician  Rhythm: sinus rhythm  Comments: Normal sinus rhythm rate of 67.  No acute ST elevation or depression.  No ischemia, no STEMI            LAB RESULTS  Recent Results (from the past 24 hour(s))   ECG 12 Lead Chest Pain    Collection Time: 06/08/24 10:15 AM   Result Value Ref Range    QT Interval 419 ms    QTC Interval 443 ms   Comprehensive Metabolic Panel    Collection Time: 06/08/24 10:31 AM    Specimen: Blood   Result Value Ref Range    Glucose 104 (H) 65 - 99 mg/dL    BUN 16 6 - 20 mg/dL    Creatinine 0.91 0.57 - 1.00 mg/dL    Sodium 140 136 - 145 mmol/L    Potassium 3.9 3.5 - 5.2 mmol/L    Chloride 105 98 - 107 mmol/L    CO2 27.0 22.0 - 29.0 mmol/L    Calcium 9.9 8.6 - 10.5 mg/dL    Total Protein 6.4 6.0 - 8.5 g/dL    Albumin 4.4 3.5 - 5.2 g/dL    ALT (SGPT) 10 1 - 33 U/L    AST (SGOT) 13 1 - 32 U/L    Alkaline Phosphatase 108 39 - 117 U/L    Total Bilirubin 0.3 0.0 - 1.2 mg/dL    Globulin 2.0 gm/dL    A/G Ratio 2.2 g/dL    BUN/Creatinine Ratio 17.6 7.0 - 25.0    Anion Gap 8.0 5.0 - 15.0 mmol/L    eGFR 79.0 >60.0 mL/min/1.73   Lipase    Collection Time: 06/08/24 10:31 AM    Specimen: Blood   Result Value Ref Range    Lipase 39 13 - 60 U/L   Single High Sensitivity Troponin T    Collection Time: 06/08/24 10:31 AM    Specimen: Blood   Result Value Ref Range    HS Troponin T 7 <14 ng/L   Magnesium    Collection Time: 06/08/24 10:31 AM    Specimen: Blood   Result Value Ref Range    Magnesium 1.9 1.6 - 2.6 mg/dL   D-dimer, Quantitative    Collection Time: 06/08/24 10:31 AM    Specimen: Blood   Result Value Ref Range    D-Dimer, Quantitative 0.13 0.00 - 0.50 MCGFEU/mL   CBC  "Auto Differential    Collection Time: 06/08/24 10:31 AM    Specimen: Blood   Result Value Ref Range    WBC 3.84 3.40 - 10.80 10*3/mm3    RBC 4.35 3.77 - 5.28 10*6/mm3    Hemoglobin 12.2 12.0 - 15.9 g/dL    Hematocrit 36.6 34.0 - 46.6 %    MCV 84.1 79.0 - 97.0 fL    MCH 28.0 26.6 - 33.0 pg    MCHC 33.3 31.5 - 35.7 g/dL    RDW 12.2 (L) 12.3 - 15.4 %    RDW-SD 37.7 37.0 - 54.0 fl    MPV 9.1 6.0 - 12.0 fL    Platelets 211 140 - 450 10*3/mm3       Ordered the above labs and independently interpreted results.  My findings will be discussed in the ED course or medical decision making section below    RADIOLOGY RESULTS  XR Chest 1 View    Result Date: 6/8/2024  XR CHEST 1 VW-   INDICATION: Anterior chest pain  COMPARISON: None  TECHNIQUE: 1 view chest  FINDINGS:  No focal opacity. No effusions. Normal mediastinal contour. Heart is normal in size.      No acute cardiopulmonary process  This report was finalized on 6/8/2024 10:43 AM by Dr. Regis Jung M.D on Workstation: [id number]        Ordered the above noted radiological studies.  Independently interpreted by me.  My findings will be discussed in the medical decision section below.     PROGRESS, DATA ANALYSIS, CONSULTS AND MEDICAL DECISION MAKING    Please note that this section constitutes my independent interpretation of clinical data including lab results, radiology, EKG's.  This constitutes my independent professional opinion regarding differential diagnosis and management of this patient.  It may include any factors such as history from outside sources, review of external records, social determinants of health, management of medications, response to those treatments, and discussions with other providers.    1130   patient is very stable at this time.  I discussed the findings on her examination.  Specifically troponin and D-dimer are negative.  I am going to make an internal referral to cardiology for her recurrent chest pain.  She also reports some \"feelings " "of bubble \"in her mid abdominal area.  I am going to place her on Pepcid twice daily and see if this has any effect.  I do think she is quite anxious.  I am going to utilize some Vistaril for this     Orders placed during this visit:  Orders Placed This Encounter   Procedures    XR Chest 1 View    Comprehensive Metabolic Panel    Lipase    Single High Sensitivity Troponin T    Magnesium    D-dimer, Quantitative    CBC Auto Differential    Manual Differential    Ambulatory Referral to Cardiology    ECG 12 Lead Chest Pain    ECG 12 Lead    CBC & Differential    ED Acknowledgement Form Needed;       Medications - No data to display         Medical Decision Making  Amount and/or Complexity of Data Reviewed  Labs: ordered.  Radiology: ordered.  ECG/medicine tests: ordered and independent interpretation performed.            DIAGNOSIS  Final diagnoses:   Nonspecific chest pain          Medication List        New Prescriptions      famotidine 20 MG tablet  Commonly known as: PEPCID  Take 1 tablet by mouth 2 (Two) Times a Day for 14 days.     hydrOXYzine pamoate 25 MG capsule  Commonly known as: Vistaril  Take 1 capsule by mouth 3 (Three) Times a Day As Needed for Anxiety.               Where to Get Your Medications        These medications were sent to [facility]/pharmacy #[id number] - [place], [place] - [address] AT [facility] - [phone]  - [phone]   [address] [place] [address]      Phone: [phone]   famotidine 20 MG tablet  hydrOXYzine pamoate 25 MG capsule         FOLLOW-UP  Lesly Miner MD  [address]  [address]  [place] [id number] [phone]    In 1 week      Suman Lemons, JOHANNA  [address] [place] [id number] [phone]              Latest Documented Vital Signs:  As of 11:45 EDT  BP- 123/81 HR- 70 Temp- 98.2 °F (36.8 °C) (Oral) O2 sat- 94%    Appropriate PPE utilized throughout this patient encounter to include mask, if indicated, per current " protocol. Hand hygiene was performed before donning PPE and after removal when leaving the room.    Please note that portions of this were completed with a voice recognition program.     Note Disclaimer: At [facility], we believe that sharing information builds trust and better relationships. You are receiving this note because you are receiving care at [facility] or recently visited. It is possible you will see health information before a provider has talked with you about it. This kind of information can be easy to misunderstand. To help you fully understand what it means for your health, we urge you to discuss this note with your provider.

## 2024-06-11 ENCOUNTER — TELEPHONE (OUTPATIENT)
Dept: OBSTETRICS AND GYNECOLOGY | Facility: CLINIC | Age: 49
End: 2024-06-11

## 2024-06-11 ENCOUNTER — OFFICE VISIT (OUTPATIENT)
Dept: OBSTETRICS AND GYNECOLOGY | Facility: CLINIC | Age: 49
End: 2024-06-11
Payer: COMMERCIAL

## 2024-06-11 VITALS
WEIGHT: 215 LBS | SYSTOLIC BLOOD PRESSURE: 115 MMHG | BODY MASS INDEX: 29.12 KG/M2 | HEIGHT: 72 IN | DIASTOLIC BLOOD PRESSURE: 71 MMHG

## 2024-06-11 DIAGNOSIS — N64.4 BREAST PAIN: Primary | ICD-10-CM

## 2024-06-11 DIAGNOSIS — N64.4 BREAST PAIN, LEFT: Primary | ICD-10-CM

## 2024-06-11 PROCEDURE — 99213 OFFICE O/P EST LOW 20 MIN: CPT | Performed by: OBSTETRICS & GYNECOLOGY

## 2024-06-11 NOTE — TELEPHONE ENCOUNTER
Pt walked over to office and we scheduled her for a DX Bilat Mammo & Left Limited US at [facility] 6/21/24 @ 1 & 130pm.

## 2024-06-11 NOTE — PROGRESS NOTES
JESSIE   Ramona Bear  is a 48 y.o. female who presents to evaluate left breast pain.  It has been present for approximately 6 weeks, but has worsened over the last 10 days.  The patient did notice it while working out.  She is doing less of that now.  The pain, however, has persisted.  It is on the left side near the medial breast and near the sternum.  The patient was concerned about possible cardiac chest pain and went to immediate care center.  There, laboratory workup, cardiogram and chest x-ray were normal.  The patient presents today for further evaluation.    Chief Complaint   Patient presents with    Follow-up     Pt here for breast issue  Pt is having left inter breast/behind the bone for about a month.   Pt had chest xray on        Past Medical History:   Diagnosis Date    Anxiety     BRCA negative     Cancer     SMALL CELL NEUROENDOCRINE CARCINOMA    Environmental allergies     Headache, tension-type     History of chemotherapy 2019    History of radiation therapy 2019    HL (hearing loss)     Peripheral neuropathy     Slow to wake up after anesthesia     AFTER HAVING PORT PLACEMENT AND REMOVAL, [facility] , TOOK OVER 2 HOURS POST OP       Past Surgical History:   Procedure Laterality Date    COLONOSCOPY N/A 12/10/2021    Procedure: COLONOSCOPY into cecum;  Surgeon: Tin Tom MD;  Location: [facility] ENDOSCOPY;  Service: Gastroenterology;  Laterality: N/A;  pre: screen  post: hemorrhoids     D & C WITH SUCTION      EYE SURGERY      HYSTERECTOMY      LAPAROSCOPIC TUBAL LIGATION      LASER ABLATION OF THE CERVIX      VENOUS ACCESS DEVICE (PORT) INSERTION AND REMOVAL         Social History     Socioeconomic History    Marital status:    Tobacco Use    Smoking status: Former     Current packs/day: 0.00     Types: Cigarettes     Quit date: 2017     Years since quittin.3     Passive exposure: Never    Smokeless tobacco: Never    Tobacco comments:     Occasional   Vaping Use    Vaping  status: Never Used   Substance and Sexual Activity    Alcohol use: Not Currently    Drug use: No    Sexual activity: Yes     Partners: Male     Birth control/protection: Surgical       The following portions of the patient's history were reviewed and updated as appropriate: allergies, current medications, past family history, past medical history, past social history, past surgical history and problem list.    Review of Systems     This is positive for left breast pain.  It is negative for shortness of breath.  Negative for unexplained weight change.  Negative for fever or chills.  Negative for nausea or vomiting.    Physical Exam  Vitals and nursing note reviewed.   Constitutional:       Appearance: Normal appearance.   Chest:      Comments: The breasts are dense bilaterally.  There are no dominant lumps palpable.  There is tenderness on the medial aspect of the left breast in the upper inner quadrant.  I do not feel a discrete mass in the area of tenderness.  There is no axillary adenopathy.  There is no nipple discharge.  The mid sternum is not tender to palpation.  Neurological:      Mental Status: She is alert and oriented to person, place, and time.         Assessment    Diagnoses and all orders for this visit:    1. Breast pain (Primary)  -     Mammo Diagnostic Bilateral With CAD; Future        Plan  I counseled the patient regarding possible causes of her pain.  Certainly, a breast workup is indicated.  The patient is tender overlying the upper inner quadrant of the left breast.  We will check a diagnostic mammogram rather than a screening mammogram.  This may also include a breast ultrasound.  In the meantime, other potential causes of the patient's pain will be worked up as well.  A referral to cardiology has been made.  The patient will contact her primary care physician to assist with scheduling this referral.  If the breast workup and cardiac workup are negative, GI workup could also be considered.  I  counseled the patient and answered her questions.  She agrees with these recommendations    No follow-ups on file.    Social History     Tobacco Use   Smoking Status Former    Current packs/day: 0.00    Types: Cigarettes    Quit date: 2017    Years since quittin.3    Passive exposure: Never   Smokeless Tobacco Never   Tobacco Comments    Occasional

## 2024-06-14 ENCOUNTER — APPOINTMENT (OUTPATIENT)
Dept: WOMENS IMAGING | Facility: HOSPITAL | Age: 49
End: 2024-06-14
Payer: COMMERCIAL

## 2024-06-14 PROCEDURE — 77062 BREAST TOMOSYNTHESIS BI: CPT | Performed by: RADIOLOGY

## 2024-06-14 PROCEDURE — 77066 DX MAMMO INCL CAD BI: CPT | Performed by: RADIOLOGY

## 2024-06-14 PROCEDURE — 76642 ULTRASOUND BREAST LIMITED: CPT | Performed by: RADIOLOGY

## 2024-06-14 PROCEDURE — G0279 TOMOSYNTHESIS, MAMMO: HCPCS | Performed by: RADIOLOGY

## 2024-06-17 DIAGNOSIS — N64.4 BREAST PAIN: ICD-10-CM

## 2024-06-17 DIAGNOSIS — N64.4 BREAST PAIN, LEFT: ICD-10-CM

## 2024-06-21 ENCOUNTER — TELEPHONE (OUTPATIENT)
Dept: OBSTETRICS AND GYNECOLOGY | Facility: CLINIC | Age: 49
End: 2024-06-21
Payer: COMMERCIAL

## 2024-06-21 NOTE — TELEPHONE ENCOUNTER
ANGEL pt and apologized for the fact that we are not in network with [employer].      I informed pt I will obtain a PA for her CT Scan to be done at [facility]/ and call her back with further details.      Also encouraged pt to call her PCP to [facility] in our office.  Otherwise she will have to establish care with a different OBGYN office.

## 2024-06-21 NOTE — TELEPHONE ENCOUNTER
Caller: APRIL/ COORDINATOR    Relationship: [facility] COORDINATOR     Best call back number: [phone]        Additional notes: APRIL IS REQUESTING A CALL BACK OR ORDER COMMENT TO CONFIRM IF ORDER FOR CT ABDOMEN PELVIS WITH CONTRAST ORDERED ON 06/06/24 NEEDS TO BE CLOSED?    PATIENT HAS [employer] [facility] INSURANCE. COPY OF CARD IS UNDER MEDIA TAB, SCANNED IN ON 06/08/24. APRIL HAS BEEN RECEIVING MESSAGES STATING ORDERS ARE DENIED DUE TO [facility] NOT BEING COVERED WITH [facility]. MESSAGES ASK  PATIENT TO CALL [phone].

## 2024-06-25 ENCOUNTER — TELEPHONE (OUTPATIENT)
Dept: OBSTETRICS AND GYNECOLOGY | Facility: CLINIC | Age: 49
End: 2024-06-25
Payer: COMMERCIAL

## 2024-06-25 NOTE — TELEPHONE ENCOUNTER
L/m for pt to call back regarding CT Scan.     Please inform pt, we did obtain a prior authorization from [employer], for her CT Scan. She is scheduled for CT Scan on Wednesday, 7/10/24 at 2:40 pm.  Pt will need to arrive at 2:10 pm, nothing to eat or drink (not even water!) 4 hours prior to arrival.  Order and PA faxed to [facility] on 6/25/24.    If pt needs to r/s she may call [facility] at [phone]

## 2024-07-08 ENCOUNTER — TELEPHONE (OUTPATIENT)
Dept: OBSTETRICS AND GYNECOLOGY | Facility: CLINIC | Age: 49
End: 2024-07-08
Payer: COMMERCIAL

## 2024-07-08 NOTE — TELEPHONE ENCOUNTER
I read the note under the 6/6/24 that the provider was not covered, so I thought that since that is the case, the scan could not be ordered and done. So it was under the referral tab under the 6/6/24 scheduled CT. Thanks, Carito

## 2024-07-08 NOTE — TELEPHONE ENCOUNTER
Per note on CT request from 6/6/24, the CT is not able to be done as we do not take [employer] insurance. I asked Hillary and she said we do not take the insurance. Thank you

## 2024-07-08 NOTE — TELEPHONE ENCOUNTER
Pt is scheduled at [facility]/ for CT Scan on 7/10/24 at 2:40 pm.  Auth was obtained on 6/24/24.  Auth and order were faxed to [place] Scheduling.   Auth information is noted in pt's CT referral.

## 2024-07-08 NOTE — TELEPHONE ENCOUNTER
Caller: NILE    Relationship: [facility]CERT     Best call back number: [phone]    What form or medical record are you requesting: PRE CERTIFICATION FOR CT OF ABDOMIN AND PELVIC WITH IV CONTRAST     How would you like to receive the form or medical records (pick-up, mail, fax): PLEASE CALL AND PROVIDE THE PRE CERT #    If fax, what is the fax number: [phone]    Timeframe paperwork needed: DUE TODAY BY 2PM OR IT WILL HAVE TO BE RESCHEDULED

## 2024-07-08 NOTE — TELEPHONE ENCOUNTER
Could you please look into this.  As you can see, there is a note from [facility] saying that the CT scan is not approved.  I believe you have already fixed this and she is scheduled for the 10th.  Thank you.

## 2024-07-08 NOTE — TELEPHONE ENCOUNTER
"Carito-can you please tell me where you are seeing the \"note on CT request\" about her CT being denied?  I assume it is from when we tried to schedule her at [facility].      We are aware [facility] offices nor the hospital accept her insurance, so I scheduled her at [facility], for this Wednesday, 7/10/24. I also faxed the authorization to [facility] twice.     I SW the patient, she has not received any mail or phone calls about her CT being denied, from [facility] or [employer] [facility].      I would like to double check everything.     Thanks,   Milla          "

## (undated) DEVICE — SENSR O2 OXIMAX FNGR A/ 18IN NONSTR

## (undated) DEVICE — KT ORCA ORCAPOD DISP STRL

## (undated) DEVICE — LN SMPL CO2 SHTRM SD STREAM W/M LUER

## (undated) DEVICE — CANN O2 ETCO2 FITS ALL CONN CO2 SMPL A/ 7IN DISP LF

## (undated) DEVICE — ADAPT CLN BIOGUARD AIR/H2O DISP

## (undated) DEVICE — TUBING, SUCTION, 1/4" X 10', STRAIGHT: Brand: MEDLINE